# Patient Record
Sex: FEMALE | Race: WHITE | NOT HISPANIC OR LATINO | Employment: OTHER | URBAN - NONMETROPOLITAN AREA
[De-identification: names, ages, dates, MRNs, and addresses within clinical notes are randomized per-mention and may not be internally consistent; named-entity substitution may affect disease eponyms.]

---

## 2020-03-11 ENCOUNTER — APPOINTMENT (EMERGENCY)
Dept: RADIOLOGY | Facility: HOSPITAL | Age: 85
DRG: 312 | End: 2020-03-11
Payer: MEDICARE

## 2020-03-11 ENCOUNTER — HOSPITAL ENCOUNTER (INPATIENT)
Facility: HOSPITAL | Age: 85
LOS: 1 days | Discharge: HOME HEALTH | DRG: 312 | End: 2020-03-13
Attending: EMERGENCY MEDICINE | Admitting: INTERNAL MEDICINE
Payer: MEDICARE

## 2020-03-11 ENCOUNTER — APPOINTMENT (EMERGENCY)
Dept: CT IMAGING | Facility: HOSPITAL | Age: 85
DRG: 312 | End: 2020-03-11
Payer: MEDICARE

## 2020-03-11 DIAGNOSIS — R53.1 GENERALIZED WEAKNESS: ICD-10-CM

## 2020-03-11 DIAGNOSIS — H53.9 VISUAL DISTURBANCE: ICD-10-CM

## 2020-03-11 DIAGNOSIS — R55 NEAR SYNCOPE: Primary | ICD-10-CM

## 2020-03-11 DIAGNOSIS — H53.10 SUBJECTIVE VISUAL DISTURBANCE OF RIGHT EYE: ICD-10-CM

## 2020-03-11 DIAGNOSIS — G45.9 TIA (TRANSIENT ISCHEMIC ATTACK): ICD-10-CM

## 2020-03-11 LAB
ALBUMIN SERPL BCP-MCNC: 3.7 G/DL (ref 3.5–5)
ALP SERPL-CCNC: 81 U/L (ref 46–116)
ALT SERPL W P-5'-P-CCNC: 17 U/L (ref 12–78)
ANION GAP SERPL CALCULATED.3IONS-SCNC: 6 MMOL/L (ref 4–13)
APTT PPP: 35 SECONDS (ref 23–37)
AST SERPL W P-5'-P-CCNC: 16 U/L (ref 5–45)
BACTERIA UR QL AUTO: NORMAL /HPF
BASOPHILS # BLD AUTO: 0.01 THOUSANDS/ΜL (ref 0–0.1)
BASOPHILS NFR BLD AUTO: 0 % (ref 0–1)
BILIRUB SERPL-MCNC: 0.61 MG/DL (ref 0.2–1)
BILIRUB UR QL STRIP: NEGATIVE
BUN SERPL-MCNC: 18 MG/DL (ref 5–25)
CALCIUM SERPL-MCNC: 8.9 MG/DL (ref 8.3–10.1)
CHLORIDE SERPL-SCNC: 106 MMOL/L (ref 100–108)
CLARITY UR: CLEAR
CO2 SERPL-SCNC: 28 MMOL/L (ref 21–32)
COLOR UR: ABNORMAL
CREAT SERPL-MCNC: 1.25 MG/DL (ref 0.6–1.3)
EOSINOPHIL # BLD AUTO: 0.03 THOUSAND/ΜL (ref 0–0.61)
EOSINOPHIL NFR BLD AUTO: 1 % (ref 0–6)
ERYTHROCYTE [DISTWIDTH] IN BLOOD BY AUTOMATED COUNT: 13.2 % (ref 11.6–15.1)
ERYTHROCYTE [SEDIMENTATION RATE] IN BLOOD: 27 MM/HOUR (ref 0–20)
GFR SERPL CREATININE-BSD FRML MDRD: 36 ML/MIN/1.73SQ M
GLUCOSE SERPL-MCNC: 99 MG/DL (ref 65–140)
GLUCOSE UR STRIP-MCNC: NEGATIVE MG/DL
HCT VFR BLD AUTO: 44.8 % (ref 34.8–46.1)
HGB BLD-MCNC: 14.2 G/DL (ref 11.5–15.4)
HGB UR QL STRIP.AUTO: NEGATIVE
IMM GRANULOCYTES # BLD AUTO: 0.05 THOUSAND/UL (ref 0–0.2)
IMM GRANULOCYTES NFR BLD AUTO: 1 % (ref 0–2)
INR PPP: 2.13 (ref 0.84–1.19)
KETONES UR STRIP-MCNC: NEGATIVE MG/DL
LEUKOCYTE ESTERASE UR QL STRIP: NEGATIVE
LYMPHOCYTES # BLD AUTO: 1.22 THOUSANDS/ΜL (ref 0.6–4.47)
LYMPHOCYTES NFR BLD AUTO: 21 % (ref 14–44)
MCH RBC QN AUTO: 29.9 PG (ref 26.8–34.3)
MCHC RBC AUTO-ENTMCNC: 31.7 G/DL (ref 31.4–37.4)
MCV RBC AUTO: 94 FL (ref 82–98)
MONOCYTES # BLD AUTO: 0.84 THOUSAND/ΜL (ref 0.17–1.22)
MONOCYTES NFR BLD AUTO: 15 % (ref 4–12)
NEUTROPHILS # BLD AUTO: 3.59 THOUSANDS/ΜL (ref 1.85–7.62)
NEUTS SEG NFR BLD AUTO: 62 % (ref 43–75)
NITRITE UR QL STRIP: NEGATIVE
NON-SQ EPI CELLS URNS QL MICRO: NORMAL /HPF
NRBC BLD AUTO-RTO: 0 /100 WBCS
NT-PROBNP SERPL-MCNC: 243 PG/ML
PH UR STRIP.AUTO: 7 [PH]
PLATELET # BLD AUTO: 246 THOUSANDS/UL (ref 149–390)
PLATELET # BLD AUTO: 263 THOUSANDS/UL (ref 149–390)
PMV BLD AUTO: 9.8 FL (ref 8.9–12.7)
PMV BLD AUTO: 9.9 FL (ref 8.9–12.7)
POTASSIUM SERPL-SCNC: 5.1 MMOL/L (ref 3.5–5.3)
PROT SERPL-MCNC: 8.5 G/DL (ref 6.4–8.2)
PROT UR STRIP-MCNC: ABNORMAL MG/DL
PROTHROMBIN TIME: 24.1 SECONDS (ref 11.6–14.5)
RBC # BLD AUTO: 4.75 MILLION/UL (ref 3.81–5.12)
RBC #/AREA URNS AUTO: NORMAL /HPF
SODIUM SERPL-SCNC: 140 MMOL/L (ref 136–145)
SP GR UR STRIP.AUTO: 1.01 (ref 1–1.03)
TROPONIN I SERPL-MCNC: <0.02 NG/ML
UROBILINOGEN UR QL STRIP.AUTO: 0.2 E.U./DL
WBC # BLD AUTO: 5.74 THOUSAND/UL (ref 4.31–10.16)
WBC #/AREA URNS AUTO: NORMAL /HPF

## 2020-03-11 PROCEDURE — 96360 HYDRATION IV INFUSION INIT: CPT

## 2020-03-11 PROCEDURE — 85652 RBC SED RATE AUTOMATED: CPT | Performed by: INTERNAL MEDICINE

## 2020-03-11 PROCEDURE — 36415 COLL VENOUS BLD VENIPUNCTURE: CPT | Performed by: EMERGENCY MEDICINE

## 2020-03-11 PROCEDURE — 70450 CT HEAD/BRAIN W/O DYE: CPT

## 2020-03-11 PROCEDURE — 93005 ELECTROCARDIOGRAM TRACING: CPT

## 2020-03-11 PROCEDURE — 99285 EMERGENCY DEPT VISIT HI MDM: CPT | Performed by: EMERGENCY MEDICINE

## 2020-03-11 PROCEDURE — 85730 THROMBOPLASTIN TIME PARTIAL: CPT | Performed by: EMERGENCY MEDICINE

## 2020-03-11 PROCEDURE — 71045 X-RAY EXAM CHEST 1 VIEW: CPT

## 2020-03-11 PROCEDURE — 85610 PROTHROMBIN TIME: CPT | Performed by: EMERGENCY MEDICINE

## 2020-03-11 PROCEDURE — 83880 ASSAY OF NATRIURETIC PEPTIDE: CPT | Performed by: EMERGENCY MEDICINE

## 2020-03-11 PROCEDURE — 85025 COMPLETE CBC W/AUTO DIFF WBC: CPT | Performed by: EMERGENCY MEDICINE

## 2020-03-11 PROCEDURE — 81001 URINALYSIS AUTO W/SCOPE: CPT | Performed by: EMERGENCY MEDICINE

## 2020-03-11 PROCEDURE — 85049 AUTOMATED PLATELET COUNT: CPT | Performed by: INTERNAL MEDICINE

## 2020-03-11 PROCEDURE — 99285 EMERGENCY DEPT VISIT HI MDM: CPT

## 2020-03-11 PROCEDURE — 84484 ASSAY OF TROPONIN QUANT: CPT | Performed by: EMERGENCY MEDICINE

## 2020-03-11 PROCEDURE — 80053 COMPREHEN METABOLIC PANEL: CPT | Performed by: EMERGENCY MEDICINE

## 2020-03-11 PROCEDURE — 99220 PR INITIAL OBSERVATION CARE/DAY 70 MINUTES: CPT | Performed by: INTERNAL MEDICINE

## 2020-03-11 RX ORDER — WARFARIN SODIUM 2.5 MG/1
2.5 TABLET ORAL
COMMUNITY

## 2020-03-11 RX ORDER — MELATONIN
2000 2 TIMES DAILY
COMMUNITY
End: 2020-03-13 | Stop reason: HOSPADM

## 2020-03-11 RX ORDER — ASPIRIN 81 MG/1
81 TABLET, CHEWABLE ORAL DAILY
Status: DISCONTINUED | OUTPATIENT
Start: 2020-03-12 | End: 2020-03-13 | Stop reason: HOSPADM

## 2020-03-11 RX ORDER — WARFARIN SODIUM 2.5 MG/1
2.5 TABLET ORAL
Status: DISCONTINUED | OUTPATIENT
Start: 2020-03-11 | End: 2020-03-13 | Stop reason: HOSPADM

## 2020-03-11 RX ORDER — NIFEDIPINE 30 MG/1
30 TABLET, EXTENDED RELEASE ORAL DAILY
Status: DISCONTINUED | OUTPATIENT
Start: 2020-03-12 | End: 2020-03-13 | Stop reason: HOSPADM

## 2020-03-11 RX ORDER — NIFEDIPINE 30 MG/1
30 TABLET, FILM COATED, EXTENDED RELEASE ORAL
COMMUNITY

## 2020-03-11 RX ADMIN — WARFARIN SODIUM 2.5 MG: 2.5 TABLET ORAL at 20:59

## 2020-03-11 RX ADMIN — SODIUM CHLORIDE 1000 ML: 0.9 INJECTION, SOLUTION INTRAVENOUS at 15:52

## 2020-03-11 NOTE — H&P
H&P- Nuria Hein 12/26/1920, 80 y o  female MRN: [id number]    Unit/Bed#: ED 04 Encounter: [id number]    Primary Care Provider: Radha Murray DO   Date and time admitted to hospital: 3/11/2020  3:04 PM        * Visual disturbance  Assessment & Plan  Follow-up on MRI  Stroke pathway  Supportive care  Weakness  Assessment & Plan  As above  Near syncope  Assessment & Plan  Orthostatic blood pressure  Gentle hydration  Neurochecks  Observation  Physical therapy/occupational therapy          VTE Prophylaxis: Enoxaparin (Lovenox)  / sequential compression device   Code Status:  Level 3 DNR  POLST: There is no POLST form on file for this patient (pre-hospital)  Discussion with family:  Family outside room  Anticipated Length of Stay:  Patient will be admitted on an Observation basis with an anticipated length of stay of  less 2 midnights  Justification for Hospital Stay:  Needs further evaluation of symptoms    Total Time for Visit, including Counseling / Coordination of Care: 45 minutes  Greater than 50% of this total time spent on direct patient counseling and coordination of care  Chief Complaint:   Near syncope    History of Present Illness:    Leilani Hampton is a 80 y o  female who presents with symptoms of weakness with presentation near-syncope  She has a known history of stroke on Coumadin therapy and hypertension who presents the hospital emergency room with complaints of lightheadedness  The patient reported be pale and shaky by the family  Her home health aide called the family who presented with her to the ER  Family particularly concerned about prior history of right visual deficits however this is chronic issue  She was brought into the hospital for possible near-syncope symptoms  She denies any loss of consciousness  Review of Systems:    Review of Systems   Constitutional: Negative for activity change, appetite change and diaphoresis     HENT: Negative for congestion  Respiratory: Negative for cough, choking, chest tightness and shortness of breath  Cardiovascular: Negative for chest pain and leg swelling  Neurological: Positive for dizziness and light-headedness  Negative for tremors, syncope, facial asymmetry, speech difficulty, weakness and headaches  All other systems reviewed and are negative  Past Medical and Surgical History:     Past Medical History:   Diagnosis Date    Hypertension     Stroke [facility])        History reviewed  No pertinent surgical history  Meds/Allergies:    Prior to Admission medications    Medication Sig Start Date End Date Taking? Authorizing Provider   NIFEdipine ER (ADALAT CC) 30 MG 24 hr tablet Take 30 mg by mouth daily   Yes Historical Provider, MD   warfarin (COUMADIN) 2 5 mg tablet Take 2 5 mg by mouth daily   Yes Historical Provider, MD     I have reviewed home medications with patient personally  Allergies: Allergies   Allergen Reactions    Ace Inhibitors     Clonidine     Fexofenadine     Gabapentin     Prednisolone     Simvastatin        Social History:     Marital Status: /Civil Union   Occupation:  Retired  Patient Pre-hospital Living Situation:  Home  Patient Pre-hospital Level of Mobility:  Ambulatory  Patient Pre-hospital Diet Restrictions:  Denies  Substance Use History:   Social History     Substance and Sexual Activity   Alcohol Use Never    Frequency: Never     Social History     Tobacco Use   Smoking Status Never Smoker   Smokeless Tobacco Never Used     Social History     Substance and Sexual Activity   Drug Use Never       Family History:    History reviewed  No pertinent family history      Physical Exam:     Vitals:   Blood Pressure: (!) 190/91 (03/11/20 1637)  Pulse: 69 (03/11/20 1637)  Temperature: 97 8 °F (36 6 °C) (03/11/20 1510)  Temp Source: Temporal (03/11/20 1510)  Respirations: 20 (03/11/20 1637)  Height: 5' 3" (160 cm) (03/11/20 1510)  Weight - Scale: 76 kg (167 lb 8 8 oz) (03/11/20 1510)  SpO2: 94 % (03/11/20 1637)    Physical Exam   Constitutional: She is oriented to person, place, and time  She appears well-developed and well-nourished  HENT:   Head: Normocephalic and atraumatic  Pulmonary/Chest: Effort normal and breath sounds normal    Neurological: She is alert and oriented to person, place, and time  She displays normal reflexes  No cranial nerve deficit or sensory deficit  She exhibits normal muscle tone  Coordination normal            Additional Data:     Lab Results: I have personally reviewed pertinent reports  Results from last 7 days   Lab Units 03/11/20  1550   WBC Thousand/uL 5 74   HEMOGLOBIN g/dL 14 2   HEMATOCRIT % 44 8   PLATELETS Thousands/uL 263   NEUTROS PCT % 62   LYMPHS PCT % 21   MONOS PCT % 15*   EOS PCT % 1     Results from last 7 days   Lab Units 03/11/20  1550   SODIUM mmol/L 140   POTASSIUM mmol/L 5 1   CHLORIDE mmol/L 106   CO2 mmol/L 28   BUN mg/dL 18   CREATININE mg/dL 1 25   ANION GAP mmol/L 6   CALCIUM mg/dL 8 9   ALBUMIN g/dL 3 7   TOTAL BILIRUBIN mg/dL 0 61   ALK PHOS U/L 81   ALT U/L 17   AST U/L 16   GLUCOSE RANDOM mg/dL 99     Results from last 7 days   Lab Units 03/11/20  1550   INR  2 13*                   Imaging: I have personally reviewed pertinent reports  XR chest 1 view portable   Final Result by Judy Tyson DO (03/11 1612)      No acute cardiopulmonary disease  Workstation performed: [id number]         CT head without contrast   Final Result by Tracy Mckee MD (03/11 1600)         1  Evidence for remote microangiopathic and ischemic disease without acute intracranial abnormality noted  Workstation performed: [id number]             ** Please Note: This note has been constructed using a voice recognition system   **

## 2020-03-11 NOTE — ASSESSMENT & PLAN NOTE
Orthostatic blood pressure  Gentle hydration  Neurochecks  Observation  Physical therapy/occupational therapy

## 2020-03-11 NOTE — ED PROVIDER NOTES
History  Chief Complaint   Patient presents with    Weakness - Generalized     pt  lives with oldest son and has home health aid came for in home visit today and pt  stated she felt "weak and shakey", c/o black spot over right eye, pt  c/o pain to right leg, hx of cva, pt  on coumadin     Patient is a 79-year-old female on Coumadin for prior stroke also with history of hypertension presents the emergency department for complaint of an episode this morning where she was lightheaded pale and shaky by report of home health aide who called family patient also reports a visual disturbance in her right eye patient family report that subsequent to her most recent stroke she had a right eye visual deficit however they believe that it may have become worse this morning the patient does however report that her vision in her right eye seems the same to her as it was yesterday  No change in mental status no focal numbness or weakness no slurred speech  History provided by:  Patient and relative  Neurologic Problem   Location:  Right eye  Severity:  Mild  Onset quality:  Unable to specify  Timing:  Intermittent  Progression:  Waxing and waning  Chronicity:  Chronic  Associated symptoms: fatigue    Associated symptoms: no abdominal pain, no chest pain, no congestion, no cough, no diarrhea, no ear pain, no fever, no headaches, no myalgias, no nausea, no rash, no rhinorrhea, no shortness of breath, no sore throat, no vomiting and no wheezing        Prior to Admission Medications   Prescriptions Last Dose Informant Patient Reported? Taking? NIFEdipine ER (ADALAT CC) 30 MG 24 hr tablet   Yes Yes   Sig: Take 30 mg by mouth daily   warfarin (COUMADIN) 2 5 mg tablet   Yes Yes   Sig: Take 2 5 mg by mouth daily      Facility-Administered Medications: None       Past Medical History:   Diagnosis Date    Hypertension     Stroke [facility])        History reviewed  No pertinent surgical history  History reviewed   No pertinent family history  I have reviewed and agree with the history as documented  E-Cigarette/Vaping    E-Cigarette Use Never User      E-Cigarette/Vaping Substances    Nicotine No     THC No     CBD No     Flavoring No     Other No      Social History     Tobacco Use    Smoking status: Never Smoker    Smokeless tobacco: Never Used   Substance Use Topics    Alcohol use: Never     Frequency: Never    Drug use: Never       Review of Systems   Constitutional: Positive for fatigue  Negative for activity change, appetite change, chills and fever  HENT: Negative for congestion, ear pain, rhinorrhea and sore throat  Eyes: Positive for visual disturbance  Negative for discharge and redness  Respiratory: Negative for cough, chest tightness, shortness of breath and wheezing  Cardiovascular: Negative for chest pain and palpitations  Gastrointestinal: Negative for abdominal pain, constipation, diarrhea, nausea and vomiting  Endocrine: Negative for polydipsia and polyuria  Genitourinary: Negative for difficulty urinating, dysuria, frequency, hematuria and urgency  Musculoskeletal: Negative for arthralgias and myalgias  Skin: Negative for color change, pallor and rash  Neurological: Positive for syncope, weakness and light-headedness  Negative for dizziness, numbness and headaches  Hematological: Negative for adenopathy  Does not bruise/bleed easily  All other systems reviewed and are negative  Physical Exam  Physical Exam   Constitutional: She is oriented to person, place, and time  She appears well-developed and well-nourished  HENT:   Head: Normocephalic and atraumatic  Right Ear: External ear normal    Left Ear: External ear normal    Nose: Nose normal    Mouth/Throat: Oropharynx is clear and moist    Eyes: Pupils are equal, round, and reactive to light  Conjunctivae and EOM are normal    Neck: Normal range of motion  Neck supple     Cardiovascular: Normal rate, regular rhythm, normal heart sounds and intact distal pulses  Pulmonary/Chest: Effort normal and breath sounds normal  No respiratory distress  She has no wheezes  She has no rales  She exhibits no tenderness  Abdominal: Soft  Bowel sounds are normal  She exhibits no distension  There is no tenderness  There is no guarding  Musculoskeletal: Normal range of motion  Neurological: She is alert and oriented to person, place, and time  No cranial nerve deficit or sensory deficit  Skin: Skin is warm and dry  Psychiatric: She has a normal mood and affect  Nursing note and vitals reviewed        Vital Signs  ED Triage Vitals   Temperature Pulse Respirations Blood Pressure SpO2   03/11/20 1505 03/11/20 1510 03/11/20 1510 03/11/20 1510 03/11/20 1510   97 8 °F (36 6 °C) 72 18 134/74 97 %      Temp Source Heart Rate Source Patient Position - Orthostatic VS BP Location FiO2 (%)   03/11/20 1505 03/11/20 1510 03/11/20 1510 03/11/20 1510 --   Temporal Monitor Sitting Left arm       Pain Score       03/11/20 1637       No Pain           Vitals:    03/11/20 1510 03/11/20 1637   BP: 134/74 (!) 190/91   Pulse: 72 69   Patient Position - Orthostatic VS: Sitting          Visual Acuity  Visual Acuity      Most Recent Value   L Pupil Size (mm)  3   R Pupil Size (mm)  3   L Pupil Shape  Round   R Pupil Shape  Round          ED Medications  Medications   sodium chloride 0 9 % bolus 1,000 mL (1,000 mL Intravenous New Bag 3/11/20 1552)       Diagnostic Studies  Results Reviewed     Procedure Component Value Units Date/Time    Urine Microscopic [[id number]]  (Normal) Collected:  03/11/20 1809    Lab Status:  Final result Specimen:  Urine, Clean Catch Updated:  03/11/20 1831     RBC, UA 0-5 /hpf      WBC, UA 0-5 /hpf      Epithelial Cells Occasional /hpf      Bacteria, UA Occasional /hpf     UA w Reflex to Microscopic w Reflex to Culture [[id number]]  (Abnormal) Collected:  03/11/20 1809    Lab Status:  Final result Specimen:  Urine, Clean Catch Updated: 03/11/20 1816     Color, UA Straw     Clarity, UA Clear     Specific Gravity, UA 1 010     pH, UA 7 0     Leukocytes, UA Negative     Nitrite, UA Negative     Protein, UA Trace mg/dl      Glucose, UA Negative mg/dl      Ketones, UA Negative mg/dl      Urobilinogen, UA 0 2 E U /dl      Bilirubin, UA Negative     Blood, UA Negative    Comprehensive metabolic panel [[id number]]  (Abnormal) Collected:  03/11/20 1550    Lab Status:  Final result Specimen:  Blood from Arm, Right Updated:  03/11/20 1619     Sodium 140 mmol/L      Potassium 5 1 mmol/L      Chloride 106 mmol/L      CO2 28 mmol/L      ANION GAP 6 mmol/L      BUN 18 mg/dL      Creatinine 1 25 mg/dL      Glucose 99 mg/dL      Calcium 8 9 mg/dL      AST 16 U/L      ALT 17 U/L      Alkaline Phosphatase 81 U/L      Total Protein 8 5 g/dL      Albumin 3 7 g/dL      Total Bilirubin 0 61 mg/dL      eGFR 36 ml/min/1 73sq m     Narrative:       [facility] guidelines for Chronic Kidney Disease (CKD):     Stage 1 with normal or high GFR (GFR > 90 mL/min/1 73 square meters)    Stage 2 Mild CKD (GFR = 60-89 mL/min/1 73 square meters)    Stage 3A Moderate CKD (GFR = 45-59 mL/min/1 73 square meters)    Stage 3B Moderate CKD (GFR = 30-44 mL/min/1 73 square meters)    Stage 4 Severe CKD (GFR = 15-29 mL/min/1 73 square meters)    Stage 5 End Stage CKD (GFR <15 mL/min/1 73 square meters)  Note: GFR calculation is accurate only with a steady state creatinine    NT-BNP PRO [[id number]]  (Normal) Collected:  03/11/20 1550    Lab Status:  Final result Specimen:  Blood from Arm, Right Updated:  03/11/20 1619     NT-proBNP 243 pg/mL     Troponin I [[id number]]  (Normal) Collected:  03/11/20 1550    Lab Status:  Final result Specimen:  Blood from Arm, Right Updated:  03/11/20 1616     Troponin I <0 02 ng/mL     Protime-INR [[id number]]  (Abnormal) Collected:  03/11/20 1550    Lab Status:  Final result Specimen:  Blood from Arm, Right Updated:  03/11/20 1612 Protime 24 1 seconds      INR 2 13    APTT [[id number]]  (Normal) Collected:  03/11/20 1550    Lab Status:  Final result Specimen:  Blood from Arm, Right Updated:  03/11/20 1612     PTT 35 seconds     CBC and differential [[id number]]  (Abnormal) Collected:  03/11/20 1550    Lab Status:  Final result Specimen:  Blood from Arm, Right Updated:  03/11/20 1558     WBC 5 74 Thousand/uL      RBC 4 75 Million/uL      Hemoglobin 14 2 g/dL      Hematocrit 44 8 %      MCV 94 fL      MCH 29 9 pg      MCHC 31 7 g/dL      RDW 13 2 %      MPV 9 9 fL      Platelets 680 Thousands/uL      nRBC 0 /100 WBCs      Neutrophils Relative 62 %      Immat GRANS % 1 %      Lymphocytes Relative 21 %      Monocytes Relative 15 %      Eosinophils Relative 1 %      Basophils Relative 0 %      Neutrophils Absolute 3 59 Thousands/µL      Immature Grans Absolute 0 05 Thousand/uL      Lymphocytes Absolute 1 22 Thousands/µL      Monocytes Absolute 0 84 Thousand/µL      Eosinophils Absolute 0 03 Thousand/µL      Basophils Absolute 0 01 Thousands/µL                  XR chest 1 view portable   Final Result by Tristan Shin DO (03/11 1612)      No acute cardiopulmonary disease  Workstation performed: [id number]         CT head without contrast   Final Result by Uri Milan MD (03/11 1600)         1  Evidence for remote microangiopathic and ischemic disease without acute intracranial abnormality noted  Workstation performed: [id number]                    Procedures  ECG 12 Lead Documentation Only  Date/Time: 3/11/2020 3:19 PM  Performed by: Belen Wilkins DO  Authorized by:  Belen Wilkins DO     ECG reviewed by me, the ED Provider: yes    Patient location:  ED  Previous ECG:     Comparison to cardiac monitor: Yes    Quality:     Tracing quality:  Limited by artifact  Rate:     ECG rate:  73    ECG rate assessment: normal    Rhythm:     Rhythm: junctional    QRS:     QRS axis:  Left  ST segments:     ST segments: Non-specific  T waves:     T waves: non-specific               ED Course  ED Course as of Mar 11 1844   Wed Mar 11, 2020   1647 Patient remains clinically hemodynamically and neurologically stable in the emergency department NIH stroke score is 0 on my neurologic evaluation she reports that her right eye symptoms are unchanged from chronic daughter is concerned that they were changed earlier also concerned about an episode of what sounds like near-syncope with shakiness and paleness that was witnessed by home care nurse today  Will refer for med surge observation for further evaluation and testing possible inpatient brain MRI for ruling out subacute infarct or recurrent stroke  Patient would not be a candidate for tPA given no inclusions present and she is on Coumadin and onset of symptoms are unclear and not highly consistent with an acute stroke  Brayden Lopez 60 with Dr Marixa Davenport reviewed case and findings and management in the ED he accepts for observation                                        MDM  Number of Diagnoses or Management Options  Generalized weakness: new and requires workup  Near syncope: new and requires workup  Subjective visual disturbance of right eye: new and requires workup  TIA (transient ischemic attack): new and requires workup     Amount and/or Complexity of Data Reviewed  Clinical lab tests: ordered and reviewed  Tests in the radiology section of CPT®: ordered and reviewed  Tests in the medicine section of CPT®: ordered and reviewed  Decide to obtain previous medical records or to obtain history from someone other than the patient: yes  Review and summarize past medical records: yes  Independent visualization of images, tracings, or specimens: yes    Risk of Complications, Morbidity, and/or Mortality  Presenting problems: moderate  Diagnostic procedures: moderate  Management options: moderate    Patient Progress  Patient progress: stable        Disposition  Final diagnoses:   Near syncope Generalized weakness   Subjective visual disturbance of right eye - Chronic per patient   TIA (transient ischemic attack) - Rule out     Time reflects when diagnosis was documented in both MDM as applicable and the Disposition within this note     Time User Action Codes Description Comment    3/11/2020  4:15 PM Marijane Sparrow Add [R55] Near syncope     3/11/2020  4:15 PM Benson Barker Add [R53 1] Generalized weakness     3/11/2020  4:16 PM Marijane Sparrow Add [H53 10] Subjective visual disturbance of right eye     3/11/2020  4:16 PM Marijane Sparrow Modify [S47 13] Subjective visual disturbance of right eye Chronic per patient    3/11/2020  4:16 PM Benson Barker Add [G45 9] TIA (transient ischemic attack)     3/11/2020  4:16 PM Marijane Sparrow Modify [G45 9] TIA (transient ischemic attack) Rule out    3/11/2020  6:27 PM Karla Hock S Add [H53 9] Visual disturbance       ED Disposition     ED Disposition Condition Date/Time Comment    Admit Stable Wed Mar 11, 2020  4:15 PM Case was discussed with Dr Isrrael Saleh and the patient's admission status was agreed to be Admission Status: inpatient status to the service of Dr Isrrael Saleh  Follow-up Information    None         Patient's Medications   Discharge Prescriptions    No medications on file     No discharge procedures on file      PDMP Review     None          ED Provider  Electronically Signed by              Vernon Gomes DO  03/11/20 5829

## 2020-03-12 ENCOUNTER — APPOINTMENT (OUTPATIENT)
Dept: MRI IMAGING | Facility: HOSPITAL | Age: 85
DRG: 312 | End: 2020-03-12
Payer: MEDICARE

## 2020-03-12 LAB
ALBUMIN SERPL BCP-MCNC: 3.2 G/DL (ref 3.5–5)
ALP SERPL-CCNC: 73 U/L (ref 46–116)
ALT SERPL W P-5'-P-CCNC: 18 U/L (ref 12–78)
ANION GAP SERPL CALCULATED.3IONS-SCNC: 11 MMOL/L (ref 4–13)
AST SERPL W P-5'-P-CCNC: 16 U/L (ref 5–45)
BILIRUB SERPL-MCNC: 0.6 MG/DL (ref 0.2–1)
BUN SERPL-MCNC: 18 MG/DL (ref 5–25)
CALCIUM SERPL-MCNC: 7.9 MG/DL (ref 8.3–10.1)
CHLORIDE SERPL-SCNC: 107 MMOL/L (ref 100–108)
CHOLEST SERPL-MCNC: 182 MG/DL (ref 50–200)
CO2 SERPL-SCNC: 25 MMOL/L (ref 21–32)
CREAT SERPL-MCNC: 1.15 MG/DL (ref 0.6–1.3)
EST. AVERAGE GLUCOSE BLD GHB EST-MCNC: 114 MG/DL
GFR SERPL CREATININE-BSD FRML MDRD: 39 ML/MIN/1.73SQ M
GLUCOSE P FAST SERPL-MCNC: 87 MG/DL (ref 65–99)
GLUCOSE SERPL-MCNC: 87 MG/DL (ref 65–140)
HBA1C MFR BLD: 5.6 %
HDLC SERPL-MCNC: 45 MG/DL
INR PPP: 2.14 (ref 0.84–1.19)
LDLC SERPL CALC-MCNC: 122 MG/DL (ref 0–100)
MAGNESIUM SERPL-MCNC: 1.9 MG/DL (ref 1.6–2.6)
PHOSPHATE SERPL-MCNC: 3.3 MG/DL (ref 2.3–4.1)
POTASSIUM SERPL-SCNC: 3.9 MMOL/L (ref 3.5–5.3)
PROT SERPL-MCNC: 7.5 G/DL (ref 6.4–8.2)
PROTHROMBIN TIME: 24.1 SECONDS (ref 11.6–14.5)
SODIUM SERPL-SCNC: 143 MMOL/L (ref 136–145)
TRIGL SERPL-MCNC: 73 MG/DL

## 2020-03-12 PROCEDURE — 99225 PR SBSQ OBSERVATION CARE/DAY 25 MINUTES: CPT | Performed by: INTERNAL MEDICINE

## 2020-03-12 PROCEDURE — 85610 PROTHROMBIN TIME: CPT | Performed by: INTERNAL MEDICINE

## 2020-03-12 PROCEDURE — 83735 ASSAY OF MAGNESIUM: CPT | Performed by: INTERNAL MEDICINE

## 2020-03-12 PROCEDURE — 92610 EVALUATE SWALLOWING FUNCTION: CPT

## 2020-03-12 PROCEDURE — 80053 COMPREHEN METABOLIC PANEL: CPT | Performed by: INTERNAL MEDICINE

## 2020-03-12 PROCEDURE — 97167 OT EVAL HIGH COMPLEX 60 MIN: CPT

## 2020-03-12 PROCEDURE — 84100 ASSAY OF PHOSPHORUS: CPT | Performed by: INTERNAL MEDICINE

## 2020-03-12 PROCEDURE — 83036 HEMOGLOBIN GLYCOSYLATED A1C: CPT | Performed by: INTERNAL MEDICINE

## 2020-03-12 PROCEDURE — 80061 LIPID PANEL: CPT | Performed by: INTERNAL MEDICINE

## 2020-03-12 PROCEDURE — 70551 MRI BRAIN STEM W/O DYE: CPT

## 2020-03-12 PROCEDURE — 97163 PT EVAL HIGH COMPLEX 45 MIN: CPT

## 2020-03-12 RX ORDER — DIAPER,BRIEF,INFANT-TODD,DISP
EACH MISCELLANEOUS 4 TIMES DAILY PRN
Status: DISCONTINUED | OUTPATIENT
Start: 2020-03-12 | End: 2020-03-13 | Stop reason: HOSPADM

## 2020-03-12 RX ADMIN — WARFARIN SODIUM 2.5 MG: 2.5 TABLET ORAL at 18:09

## 2020-03-12 RX ADMIN — NIFEDIPINE 30 MG: 30 TABLET, FILM COATED, EXTENDED RELEASE ORAL at 08:07

## 2020-03-12 RX ADMIN — ASPIRIN 81 MG 81 MG: 81 TABLET ORAL at 08:07

## 2020-03-12 RX ADMIN — ENOXAPARIN SODIUM 40 MG: 40 INJECTION SUBCUTANEOUS at 08:07

## 2020-03-12 NOTE — SOCIAL WORK
CM Consult for Ischemic Stroke  Pt is in OBS status  CM met with patient and son Eun Mayer at the bedside,baseline information was obtained  CM discussed the role of CM in helping the patient develop a discharge plan and assist the patient in carry out their plan  Pt lives with her other son Issac Lacey in a 1 SH, 1 ALFONSO  Pt requires minimal assistance for ADLs  Pt has a caregiver through [employer] that comes to the home M-W-F for 2-2 1/2 hours to assist with ADLs and housework  Pt ambulates with a Rollator walker or cane (within home)  Pt is retired  Pt does not drive she relies on her family for transportation  Pt has past hx of STR at [facility] and [address] at d/c from [address] [facility]  Pt denies hx of MH or D&A tx  PCP: Dr Juan Daniel Mckee at [facility]   Preferred Pharmacy: [facility]  Contact: Oriana Candelario (son) [phone] --lives in [place], [place] or [facility] (daughter) [phone]  Pt states her son Eun Mayer and daughter Laurie Garza share [place]  CM made f/u PCP for pt   Information added to AVS

## 2020-03-12 NOTE — PHYSICAL THERAPY NOTE
PHYSICAL THERAPY EVALUATION  NAME:  Tanya   DATE: 03/12/20    AGE:   80 y o   Mrn:   [id number]  ADMIT DX:  TIA (transient ischemic attack) [G45 9]  Weakness [R53 1]  Visual disturbance [H53 9]  Subjective visual disturbance of right eye [H53 10]  Near syncope [R55]  Generalized weakness [R53 1]    Past Medical History:   Diagnosis Date    Hypertension     Stroke ([facility] [address] )      Length Of Stay: 0  Performed at least 2 patient identifiers during session: Name and Birthday  PHYSICAL THERAPY EVALUATION :    03/12/20 1052   Note Type   Note type Eval only   Pain Assessment   Pain Assessment Tool 0-10   Pain Score No Pain  (at rest  c/o pain R thigh with mobility "a little")   Home Living   Type of [address] [place] [address] One level  (1 ALFONSO w L HR)   Bathroom Shower/Tub Tub/shower unit   Bathroom Toilet Raised   Bathroom Equipment Tub transfer bench;Grab bars in shower;Grab bars around toilet   Bathroom Accessibility Not accessible  (has grab bars to access bathroom as walker doesn't fit)   Home Equipment Walker;Cane;Wheelchair-manual  (cane in and out of the house, rollator in the house)   Additional Comments Aides 3x/week for bathing and cleaning  Has manual chair and transport chair for outside the home  Prior Function   Level of [place] Independent with ADLs and functional mobility   Lives With Son  (son lives in the basement)   Receives Help From Family   ADL Assistance Independent  (has assistance from aid 3x/week for bathing and cleaning)   IADLs Needs assistance   Falls in the last 6 months 0   Comments Pt reports being (I) with rollator PTA  Reports being Independent with ADLs but requires assistance with showering  Restrictions/Precautions   Other Precautions Fall Risk;Visual impairment; Bed Alarm; Chair Alarm;Cognitive;Telemetry;Multiple lines  (L homonymous hemionopsia  )   General   Additional Pertinent History Pt with h/o CVA with L visual field cut since stroke in 2006  Family/Caregiver Present Yes  (son)   Cognition   Orientation Level Oriented to person;Oriented to time  ("at the nursing home " requires cues for place/situation)   Following Commands Follows one step commands with increased time or repetition   RLE Assessment   RLE Assessment WFL  (strength 4-/5)   LLE Assessment   LLE Assessment WFL  (strength 4-/5)   Coordination   Movements are Fluid and Coordinated 0   Coordination and Movement Description decreased with alternating toe tapping on L   Light Touch   RLE Light Touch Grossly intact  (pt's son reports neuropathy)   LLE Light Touch Grossly intact  (pt's son reports neuropathy)   Bed Mobility   Supine to Sit 5  Supervision   Additional items Increased time required   Additional Comments HOB flat without bedrail   Transfers   Sit to Stand   (close supervision)   Additional items Verbal cues; Increased time required  (min cues for hand placement for safety)   Stand to Sit   (close supervision)   Additional items Increased time required;Verbal cues  (min cues for controlled descent)   Stand pivot   (close supervision)   Additional items Verbal cues; Increased time required  (min cues for turning completely prior to sitting)   Additional Comments min cues for hand placement for safety with RW   Ambulation/Elevation   Gait pattern Antalgic;Narrow KACIE; Decreased L stance; Short stride; Excessively slow; Step to  (step to lead R LE)   Gait Assistance 5  Supervision   Additional items Verbal cues  (min cues for direction)   Assistive Device Rolling walker   Distance 29' with RW with supervision with min cues for direction, wt shifing onto UEs for R LE stance to facilitate L LE step length   Stair Management Assistance   (declined stairs today )   Balance   Static Sitting Fair +   Dynamic Sitting Fair   Static Standing Fair   Dynamic Standing [address] [place] [address],Floors 3,4, & 5 -   Activity Tolerance   Activity Tolerance Patient limited by fatigue;Patient limited by pain   Medical Staff Made Aware OT, [place]   Nurse Made Aware RNCherelle   Assessment   Prognosis Good   Problem List Decreased strength;Decreased endurance; Impaired balance;Decreased mobility; Decreased cognition; Impaired judgement;Decreased safety awareness;Pain; Impaired vision   Barriers to Discharge Decreased caregiver support   Goals   Patient Goals "Go home"   STG Expiration Date 03/22/20   PT Treatment Day 0   Plan   Treatment/Interventions Functional transfer training;LE strengthening/ROM; Elevations; Therapeutic exercise; Endurance training;Cognitive reorientation;Patient/family training;Equipment eval/education; Bed mobility;Gait training; Compensatory technique education;Spoke to nursing;OT   PT Frequency Other (Comment)  (3-5x/week)   Recommendation   Recommendation Home with family support;Home PT   Equipment Recommended   (rollator-pt has)   Additional Comments [facility] 6 clicks 65/02     (Please find full objective findings from PT assessment regarding body systems outlined above)  Assessment: Pt is a 80 y o  female seen for PT evaluation s/p admission to [address] [place] [address] on 3/11/2020 with Visual disturbance  Pt with 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  Order placed for PT services    Upon evaluation: Pt is presenting with impaired functional mobility due to pain, decreased strength, decreased endurance, impaired balance, impaired coordination, gait deviations, impaired cognition, decreased safety awareness, impaired judgment, visual impairment and fall risk requiring supervision assistance for bed mobility, supervision to steadying assistance for transfers and supervision assistance for ambulation with RW  Pt's clinical presentation is currently unpredictable given the functional mobility deficits above, especially weakness, decreased endurance, gait deviations, pain, decreased activity tolerance, decreased functional mobility tolerance, decreased safety awareness, impaired judgement and decreased cognition, coupled with fall risks as indicated by AM-PAC 6-Clicks: 27/34 as well as impaired balance, impaired judgement, decreased safety awareness, altered vision and decreased cognition and combined with medical complications of hypotension, pain impacting overall mobility status and fear/retreat  Pt's PMHx and comorbidities that may affect physical performance and progress include: HTN, CVA and limited vision  Personal factors affecting pt at time of IE include: step(s) to enter environment, advanced age, inability to perform ADLs, inability to navigate level surfaces without external assistance and inability to ambulate household distances  Pt will benefit from continued skilled PT services to address deficits as defined above and to maximize level of functional mobility to facilitate return toward PLOF and improved QOL  From PT/mobility standpoint, recommendation at time of d/c would be Home PT, home with family support and with walker pending progress in order to reduce fall risk and maximize pt's functional independence and consistency with mobility in order to facilitate return to PLOF  Recommend ther ex next 1-2 sessions  Goals: Pt will: Perform bed mobility tasks with modified I to reposition in bed and prepare for transfers  Pt will perform transfers with modified I to increase Indep in home environment and decrease risk for falls and prepare for ambulation  Pt will ambulate with RW for >/= 48' with  modified I  to increase Indep in home environment, decrease burden of care, decrease risk for falls, improve activity tolerance and improve gait quality and to access home environment  Pt will complete >/= 1 step with with unilateral handrail with modified I to increase Indep in home environment, return to home with ALFONSO and decrease risk for falls          Sanjana Jhaveri, PT,DPT

## 2020-03-12 NOTE — PROGRESS NOTES
Progress Note - Nuria Hein 1920, 80 y o  female MRN: [id number]    Unit/Bed#: -Cynthia Encounter: [id number]    Primary Care Provider: Radha Murray DO   Date and time admitted to hospital: 3/11/2020  3:04 PM        * Visual disturbance  Assessment & Plan  Follow-up on MRI  Stroke pathway  Supportive care  Weakness  Assessment & Plan  As above  Near syncope  Assessment & Plan  Orthostatic blood pressure  Gentle hydration  Neurochecks  Observation  Physical therapy/occupational therapy      VTE Pharmacologic Prophylaxis:   Pharmacologic: Enoxaparin (Lovenox)  Mechanical VTE Prophylaxis in Place: No    Patient Centered Rounds: I have performed bedside rounds with nursing staff today  Time Spent for Care: 15 minutes  More than 50% of total time spent on counseling and coordination of care as described above  Current Length of Stay: 0 day(s)    Current Patient Status: Observation   Certification Statement: The patient will continue to require additional inpatient hospital stay due to Need to monitor neurological status    Discharge Plan:  Follow-up on neuro imaging and Neurology consultation    Code Status: Level 3 - DNAR and DNI      Subjective:   No acute distress    Objective:     Vitals:   Temp (24hrs), Av 5 °F (36 4 °C), Min:97 1 °F (36 2 °C), Max:97 8 °F (36 6 °C)    Temp:  [97 1 °F (36 2 °C)-97 8 °F (36 6 °C)] 97 5 °F (36 4 °C)  HR:  [67-99] 74  Resp:  [16-20] 17  BP: (134-190)/() 156/88  SpO2:  [90 %-97 %] 94 %  Body mass index is 29 68 kg/m²  Input and Output Summary (last 24 hours): Intake/Output Summary (Last 24 hours) at 3/12/2020 0953  Last data filed at 3/11/2020 2100  Gross per 24 hour   Intake 240 ml   Output 650 ml   Net -410 ml       Physical Exam:     Physical Exam   HENT:   Head: Normocephalic and atraumatic  Pulmonary/Chest: Effort normal    Abdominal: Soft  Neurological: She is alert         Additional Data:     Labs:    Results from last 7 days   Lab Units 03/11/20  2112 03/11/20  1550   WBC Thousand/uL  --  5 74   HEMOGLOBIN g/dL  --  14 2   HEMATOCRIT %  --  44 8   PLATELETS Thousands/uL 246 263   NEUTROS PCT %  --  62   LYMPHS PCT %  --  21   MONOS PCT %  --  15*   EOS PCT %  --  1     Results from last 7 days   Lab Units 03/12/20  0535   POTASSIUM mmol/L 3 9   CHLORIDE mmol/L 107   CO2 mmol/L 25   BUN mg/dL 18   CREATININE mg/dL 1 15   CALCIUM mg/dL 7 9*   ALK PHOS U/L 73   ALT U/L 18   AST U/L 16     Results from last 7 days   Lab Units 03/12/20  0535   INR  2 14*       * I Have Reviewed All Lab Data Listed Above  * Additional Pertinent Lab Tests Reviewed: All Labs Within Last 24 Hours Reviewed      Recent Cultures (last 7 days):           Last 24 Hours Medication List:     Current Facility-Administered Medications:  aspirin 81 mg Oral Daily Sole Sutton DO   enoxaparin 40 mg Subcutaneous Daily Sole Sutton DO   NIFEdipine ER 30 mg Oral Daily Sole Sutton DO   warfarin 2 5 mg Oral Daily (warfarin) Sole Sutton DO        Today, Patient Was Seen By: Nell Whitt DO    ** Please Note: Dictation voice to text software may have been used in the creation of this document   **

## 2020-03-12 NOTE — UTILIZATION REVIEW
Initial Clinical Review    Admission: Date/Time/Statement: Admission Orders (From admission, onward)     Ordered        03/11/20 1645  Place in Observation (expected length of stay for this patient is less than two midnights)  Once                   Orders Placed This Encounter   Procedures    Place in Observation (expected length of stay for this patient is less than two midnights)     Standing Status:   Standing     Number of Occurrences:   1     Order Specific Question:   Admitting Physician     Answer:   Griselda Townsend [[id number]]     Order Specific Question:   Level of Care     Answer:   Med Surg [[id number]]     ED Arrival Information     Expected Arrival Acuity Means of Arrival Escorted By Service Admission Type    3/11/2020  3/11/2020 15:02 Urgent Walk-In Family Member General Medicine Urgent    Arrival Complaint    ams        Chief Complaint   Patient presents with    Weakness - Generalized     pt  lives with oldest son and has home health aid came for in home visit today and pt  stated she felt "weak and shakey", c/o black spot over right eye, pt  c/o pain to right leg, hx of cva, pt  on coumadin     Assessment/Plan: 80 year old female to the ED from home with complaints of lightheadedness, visual disturbance on morning of her arrival   Admitted under observation for visual disturbance, near syncope  She has a known history of stroke on Coumadin therapy and hypertension who presents the hospital emergency room with complaints of lightheadedness  The patient reported be pale and shaky by the family  Her home health aide called the family who presented with her to the ER  Family particularly concerned about prior history of right visual deficits however this is chronic issue  NIHSS 0  Visual disturbance  Assessment & Plan  Follow-up on MRI    Stroke pathway  Supportive care      Weakness  Assessment & Plan  As above      Near syncope  Assessment & Plan  Orthostatic blood pressure  Gentle hydration  Neurochecks  Observation  Physical therapy/occupational therapy        ED Triage Vitals   Temperature Pulse Respirations Blood Pressure SpO2   03/11/20 1505 03/11/20 1510 03/11/20 1510 03/11/20 1510 03/11/20 1510   97 8 °F (36 6 °C) 72 18 134/74 97 %      Temp Source Heart Rate Source Patient Position - Orthostatic VS BP Location FiO2 (%)   03/11/20 1505 03/11/20 1510 03/11/20 1510 03/11/20 1510 --   Temporal Monitor Sitting Left arm       Pain Score       03/11/20 1637       No Pain        Wt Readings from Last 1 Encounters:   03/11/20 76 kg (167 lb 8 8 oz)     Additional Vital Signs:   Date/Time  Temp  Pulse  Resp  BP  MAP (mmHg)  SpO2  O2 Device  Patient Position - Orthostatic VS   03/12/20 07:11:42  97 5 °F (36 4 °C)  74  17  156/88  111  94 %  --  --   03/12/20 0655  97 5 °F (36 4 °C)  79  18  156/88  111  94 %  None (Room air)  Lying   03/12/20 0455  97 6 °F (36 4 °C)  74  17  171/95Abnormal   120  93 %  None (Room air)  Lying   03/12/20 03:07:44  97 7 °F (36 5 °C)  67  17  158/76  103  96 %  None (Room air)  Lying   03/12/20 0255  97 7 °F (36 5 °C)  70  16  158/76  103  95 %  None (Room air)  Lying   03/12/20 01:02:48  97 6 °F (36 4 °C)  80  --  153/79  104  93 %  --  --   03/12/20 0055  97 1 °F (36 2 °C)Abnormal   80  18  153/79  104  94 %  None (Room air)  Lying   03/11/20 2355  97 4 °F (36 3 °C)Abnormal   75  16  141/88  106  90 %  None (Room air)  Lying   03/11/20 2255  97 3 °F (36 3 °C)Abnormal   80  16  160/89  113  94 %  None (Room air)  Lying   03/11/20 21:56:54  97 4 °F (36 3 °C)Abnormal   99  16  161/89  113  94 %  None (Room air)  Lying   03/11/20 2055  97 4 °F (36 3 °C)Abnormal   75  17  174/93Abnormal   120  92 %  --  Sitting   03/11/20 1955  97 4 °F (36 3 °C)Abnormal   67  16  167/118Abnormal   134  96 %  None (Room air)  Sitting   03/11/20 19:44:50  97 4 °F (36 3 °C)Abnormal   74  18  167/118Abnormal   134  93 %  --  --   03/11/20 1637  --  69  20  190/91Abnormal   -- Pertinent Labs/Diagnostic Test Results:   3/11 EKG:  Quality:     Tracing quality:  Limited by artifact  Rate:     ECG rate:  73    ECG rate assessment: normal    Rhythm:     Rhythm: junctional    QRS:     QRS axis:  Left  ST segments:     ST segments:  Non-specific  T waves:     T waves: non-specific          3/11 CXR:  No acute cardiopulmonary disease  3/11 CT head:  Evidence for remote microangiopathic and ischemic disease without acute intracranial abnormality noted    Results from last 7 days   Lab Units 03/11/20 2112 03/11/20  1550   WBC Thousand/uL  --  5 74   HEMOGLOBIN g/dL  --  14 2   HEMATOCRIT %  --  44 8   PLATELETS Thousands/uL 246 263   NEUTROS ABS Thousands/µL  --  3 59         Results from last 7 days   Lab Units 03/12/20  0535 03/11/20  1550   SODIUM mmol/L 143 140   POTASSIUM mmol/L 3 9 5 1   CHLORIDE mmol/L 107 106   CO2 mmol/L 25 28   ANION GAP mmol/L 11 6   BUN mg/dL 18 18   CREATININE mg/dL 1 15 1 25   EGFR ml/min/1 73sq m 39 36   CALCIUM mg/dL 7 9* 8 9   MAGNESIUM mg/dL 1 9  --    PHOSPHORUS mg/dL 3 3  --      Results from last 7 days   Lab Units 03/12/20  0535 03/11/20  1550   AST U/L 16 16   ALT U/L 18 17   ALK PHOS U/L 73 81   TOTAL PROTEIN g/dL 7 5 8 5*   ALBUMIN g/dL 3 2* 3 7   TOTAL BILIRUBIN mg/dL 0 60 0 61         Results from last 7 days   Lab Units 03/12/20  0535 03/11/20  1550   GLUCOSE RANDOM mg/dL 87 99         Results from last 7 days   Lab Units 03/12/20  0535   HEMOGLOBIN A1C % 5 6   EAG mg/dl 114     Results from last 7 days   Lab Units 03/11/20  1550   TROPONIN I ng/mL <0 02         Results from last 7 days   Lab Units 03/12/20  0535 03/11/20  1550   PROTIME seconds 24 1* 24 1*   INR  2 14* 2 13*   PTT seconds  --  35       Results from last 7 days   Lab Units 03/11/20  1550   NT-PRO BNP pg/mL 243       Results from last 7 days   Lab Units 03/11/20  2113   SED RATE mm/hour 27*         Results from last 7 days   Lab Units 03/11/20  1809   CLARITY UA  Clear   COLOR UA Straw   SPEC GRAV UA  1 010   PH UA  7 0   GLUCOSE UA mg/dl Negative   KETONES UA mg/dl Negative   BLOOD UA  Negative   PROTEIN UA mg/dl Trace*   NITRITE UA  Negative   BILIRUBIN UA  Negative   UROBILINOGEN UA E U /dl 0 2   LEUKOCYTES UA  Negative   WBC UA /hpf 0-5   RBC UA /hpf 0-5   BACTERIA UA /hpf Occasional   EPITHELIAL CELLS WET PREP /hpf Occasional     ED Treatment:   Medication Administration from 03/11/2020 1459 to 03/11/2020 1930       Date/Time Order Dose Route Action     03/11/2020 1552 sodium chloride 0 9 % bolus 1,000 mL 1,000 mL Intravenous New Bag        Past Medical History:   Diagnosis Date    Hypertension     Stroke [facility])        Admitting Diagnosis: TIA (transient ischemic attack) [G45 9]  Weakness [R53 1]  Visual disturbance [H53 9]  Subjective visual disturbance of right eye [H53 10]  Near syncope [R55]  Generalized weakness [R53 1]  Age/Sex: 80 y o  female  Admission Orders:  Neuro checks:  Every 1 hour x 4 hours, then every 2 hours x 8 hours, then every 4 hours x 72 hours  NIHSS every 24 hours  MRI   SCDS  ECHO  Scheduled Medications:    Medications:  aspirin 81 mg Oral Daily   enoxaparin 40 mg Subcutaneous Daily   NIFEdipine ER 30 mg Oral Daily   warfarin 2 5 mg Oral Daily (warfarin)     Continuous IV Infusions:     PRN Meds:       IP CONSULT TO NEUROLOGY  IP CONSULT TO CASE MANAGEMENT  IP CONSULT TO NUTRITION SERVICES    Network Utilization Review Department  [email or web address]  ATTENTION: Please call with any questions or concerns to [phone] and carefully listen to the prompts so that you are directed to the right person  All voicemails are confidential   Judd Lawson all requests for admission clinical reviews, approved or denied determinations and any other requests to dedicated fax number below belonging to the campus where the patient is receiving treatment   List of dedicated fax numbers for the Facilities:  FACILITY NAME UR FAX NUMBER   ADMISSION DENIALS (Administrative/Medical Necessity) [address] [facility] (Maternity/NICU/Pediatrics) [facility] [phone]   [facility] [phone]   [facility] [phone]   [facility] [address] [facility] [phone]   [facility] [phone]   [address] [facility] [address]  [address] [phone]

## 2020-03-12 NOTE — SPEECH THERAPY NOTE
Speech-Language Pathology Bedside Swallow Evaluation    Patient Name: Annabelle Maharaj    HOHGA'I Date: 3/12/2020     Problem List  Principal Problem:    Visual disturbance  Active Problems:    Near syncope    Weakness      Past Medical History  Past Medical History:   Diagnosis Date    Hypertension     Stroke [facility])        Past Surgical History  History reviewed  No pertinent surgical history  Summary   Pt presented with functional appearing oral and pharyngeal stage swallowing skills with materials administered today  She reported occasional "sticking" when she swallows hard/regular solids, but manages by taking drinks during meals  No s/s aspiration or significant dysphagia with thin liquids or regular solids offered today  Risk/s for Aspiration: suspect low    Recommended Diet: regular diet and thin liquids   Recommended Form of Meds: as tolerated   Aspiration precautions and swallowing strategies: upright posture, only feed when fully alert, slow rate of feeding, small bites/sips and alternating bites and sips  Other Recommendations/Considerations: SLP offered chopped diet to pt but she said "I can cut the food if I need to "       Current Medical Status per Dr Astrid Bai H&P 3/11/2020  Annabelle Maharaj is a 80 y o  female who presents with symptoms of weakness with presentation near-syncope  She has a known history of stroke on Coumadin therapy and hypertension who presents the hospital emergency room with complaints of lightheadedness  The patient reported be pale and shaky by the family  Her home health aide called the family who presented with her to the ER  Family particularly concerned about prior history of right visual deficits however this is chronic issue  She was brought into the hospital for possible near-syncope symptoms  She denies any loss of consciousness  Current Precautions:  Fall     Special Studies:  CXR 3/11/2020 IMPRESSION:  No acute cardiopulmonary disease    CT head 3/11/2020 IMPRESSION:  1  Evidence for remote microangiopathic and ischemic disease without acute intracranial abnormality noted  Social/Education/Vocational Hx:  Pt lives with family    Swallow Information   Current Risks for Dysphagia & Aspiration: report of occasional "sticking" of food when she swallows solid foods  Current Diet: regular diet and thin liquids   Baseline Diet: regular diet and thin liquids      Baseline Assessment   Behavior/Cognition: alert  Speech/Language Status: able to participate in conversation and able to follow commands  Patient Positioning: upright in chair  Pain Status/Interventions/Response to Interventions:  No report of or nonverbal indications of pain  Swallow Mechanism Exam  Facial: symmetrical  Labial: decreased ROM left side  Lingual: WFL  Velum: symmetrical  Mandible: adequate ROM  Dentition: full dentures  Vocal quality:clear/adequate   Volitional Cough: strong/productive       Consistencies Assessed and Performance   Consistencies Administered: thin liquids, puree and hard solids  Materials administered included apple juice, apple sauce, rohan doone cookies    Oral Stage: WFL  Mastication was adequate with the materials administered today  Bolus formation and transfer were functional  Mild oral residual noted but cleared with liquid wash  No overt s/s reduced oral control  Pharyngeal Stage: WFL  Swallow Mechanics: Swallowing initiation appeared prompt  Laryngeal rise was palpated and judged to be within functional limits  No coughing, throat clearing, change in vocal quality or respiratory status noted today  Esophageal Concerns: none reported      Summary and Recommendations (see above)    Results Reviewed with: patient and family     Treatment Recommended: no additional f/u needed at this time

## 2020-03-12 NOTE — PLAN OF CARE
Problem: OCCUPATIONAL THERAPY ADULT  Goal: Performs self-care activities at highest level of function for planned discharge setting  See evaluation for individualized goals  Description  Treatment Interventions: ADL retraining, Functional transfer training, UE strengthening/ROM, Endurance training, Cognitive reorientation, Patient/family training, Equipment evaluation/education, Neuromuscular reeducation, Compensatory technique education, Continued evaluation, Energy conservation, Activityengagement          See flowsheet documentation for full assessment, interventions and recommendations  Note:   Limitation: Decreased ADL status, Decreased UE strength, Decreased Safe judgement during ADL, Decreased cognition, Decreased endurance, Decreased self-care trans, Decreased high-level ADLs  Prognosis: Good  Assessment: Pt is a 79 y/o F admitted to [address] [place] [address] under observation 3/11/2020 d/t experiencing a near-syncope episode with visual disturbance noted  Dx: visual disturbance  Pt with PMHx impacting performance during functional tasks including: HTN, past CVA  Pt reports living at home in a 1 story home with her son  Pt has 1 ALFONSO L HR into home  Pt ambulates with use of cane or rollator in the home and has a transport chair and a manual w/c for long distance community use PRN  Pt has a tub bench and grab bars in shower as well as grab bars around the toilet, raised toilet seat and grab bars on the bathroom walls d/t Pt's inability to fit rollator into restroom  Pt completes ADLs and functional mobility @ Mod I although [facility] for bathing and housekeeping tasks  Pt has assistance for all other IADL tasks  On evaluation, Pt completing supine>sit @ S with HOB flat and bed rails  Pt completing LB dressing @ CGA with extended time PRN for donning socks  Pt requiring CGA for CM while standing with UB support from RW PRN  Pt completing UB ADLs @ S after set-up   Pt completing STS and SPT transfers @ S-CGA with vc'ing for hand placement and safety  Pt's vision assessed while seated EOB  Pt able to track finger in all planes with decreased smoothness and slight loss of finger into L visual field (d/t L visual field cut d/t past CVA, 2006)  Pt able to read word in word search and reports no blurriness at this time  Pt scoring 55/100 on Barthel Index  Pt presents with decrease activity tolerance, decrease standing balance, decrease performance during ADL tasks, decrease safety awareness , decrease BUE ROM, decrease generalized strength, decrease activity engagement and decrease performance during functional transfers  Pt would benefit from continued acute OT services to address deficits as well as HHOT with continued support and assistance from family PRN upon d/c from [address] [place] [address]    Recommendation: ([address] with continued assistance from family)  OT Discharge Recommendation: ([address] with continued assistance from family)

## 2020-03-12 NOTE — SOCIAL WORK
CM followed up with pt and son to briefly discuss d/c planning  CM discussed therapy recommendations for home PT/OT  Pt and son agreeable  CM provided a list of local agencies  A post acute care recommendation was made by your care team for [facility]  Discussed Freedom of Choice with both patient and POA  List of agencies given to both patient and POA via in person  both patient and POA aware the list is custom filtered for them by zip code location and that [facility] post acute providers are designated  Pt and son requesting referrals to 1) [facility] 2) [facility] and 3) [facility]  Received call from [facility]; they are unable to accept pt d/t to staffing and case load  [facility] is able to [facility] pt with [facility] on 03/13/20  CM updated family and attending that all CM needs have been addressed

## 2020-03-12 NOTE — PLAN OF CARE
Problem: PHYSICAL THERAPY ADULT  Goal: Performs mobility at highest level of function for planned discharge setting  See evaluation for individualized goals  Description  Treatment/Interventions: Functional transfer training, LE strengthening/ROM, Elevations, Therapeutic exercise, Endurance training, Cognitive reorientation, Patient/family training, Equipment eval/education, Bed mobility, Gait training, Compensatory technique education, Spoke to nursing, OT  Equipment Recommended: (rollator-pt has)       See flowsheet documentation for full assessment, interventions and recommendations  Note:   Prognosis: Good  Problem List: Decreased strength, Decreased endurance, Impaired balance, Decreased mobility, Decreased cognition, Impaired judgement, Decreased safety awareness, Pain, Impaired vision  Assessment: Pt is a 80 y o  female seen for PT evaluation s/p admission to [address] [place] [address] on 3/11/2020 with Visual disturbance  Pt with 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  Order placed for PT services    Upon evaluation: Pt is presenting with impaired functional mobility due to pain, decreased strength, decreased endurance, impaired balance, impaired coordination, gait deviations, impaired cognition, decreased safety awareness, impaired judgment, visual impairment and fall risk requiring supervision assistance for bed mobility, supervision to steadying assistance for transfers and supervision assistance for ambulation with RW  Pt's clinical presentation is currently unpredictable given the functional mobility deficits above, especially weakness, decreased endurance, gait deviations, pain, decreased activity tolerance, decreased functional mobility tolerance, decreased safety awareness, impaired judgement and decreased cognition, coupled with fall risks as indicated by AM-PAC 6-Clicks: 70/17 as well as impaired balance, impaired judgement, decreased safety awareness, altered vision and decreased cognition and combined with medical complications of hypotension, pain impacting overall mobility status and fear/retreat  Pt's PMHx and comorbidities that may affect physical performance and progress include: HTN, CVA and limited vision  Personal factors affecting pt at time of IE include: step(s) to enter environment, advanced age, inability to perform ADLs, inability to navigate level surfaces without external assistance and inability to ambulate household distances  Pt will benefit from continued skilled PT services to address deficits as defined above and to maximize level of functional mobility to facilitate return toward PLOF and improved QOL  From PT/mobility standpoint, recommendation at time of d/c would be Home PT, home with family support and with walker pending progress in order to reduce fall risk and maximize pt's functional independence and consistency with mobility in order to facilitate return to PLOF  Recommend ther ex next 1-2 sessions  Barriers to Discharge: Decreased caregiver support     Recommendation: Home with family support, Home PT          See flowsheet documentation for full assessment

## 2020-03-12 NOTE — CONSULTS
Consult received for stroke pathway  LDL levels elevated  Pt and family report consumption of snack foods through out the day and minimal physical activity  Pt and family did not have nutrition questions at this time  Given advanced age, will provide diet ed as requested by pt  Will continue to follow

## 2020-03-12 NOTE — OCCUPATIONAL THERAPY NOTE
Occupational Therapy Evaluation     Patient Name: Gomez Ferrera  RFXHO'P Date: 3/12/2020  Problem List  Principal Problem:    Visual disturbance  Active Problems:    Near syncope    Weakness    Past Medical History  Past Medical History:   Diagnosis Date    Hypertension     Stroke [facility])      Past Surgical History  History reviewed  No pertinent surgical history  03/12/20 1118   Note Type   Note type Eval only   Pain Assessment   Pain Assessment Tool 0-10   Pain Score No Pain   Home Living   Type of Home House   Home Layout One level  (1 ALFONSO L HR)   Bathroom Shower/Tub Tub/shower unit   Bathroom Toilet Raised   Bathroom Equipment Tub transfer bench;Grab bars in shower;Grab bars around toilet   Bathroom Accessibility   (Pt has grab bars on wall d/t inability to fit RW in bathroom)   Home Equipment Walker;Cane;Wheelchair-manual  (rollator)   Additional Comments Pt reports living in a 1 story home with 1 ALFONSO L HR with her son  Pt ambulates usually ambulating in cane/rollator in the home  Pt completes ADLs @ Mod I although has assistance from [facility] aides that assist with bathing and housekeeping tasks 3x/wk  Pt has a transport chair and a manual w/c for long distance/community use  Prior Function   Lives With Son  (son lives in the basement)   Josiane Jay Help From Family   ADL Assistance Independent  (has assistance from aid 3x/week for bathing and cleaning)   IADLs Needs assistance   Falls in the last 6 months 0   Comments Pt reports completing ADLs @ Mod I although has assistance for bathing  Pt has assistance for all IADL tasks and community mobility  Lifestyle   Autonomy Pt completing ADLs and functional mobility @ Mod I   Reciprocal Relationships Pt lives at home with her son and has other family close by   Service to Others Pt is retired   Intrinsic Gratification Pt enjoys completing word searches     ADL   Where Assessed Edge of bed   UB Dressing Assistance 5  Supervision/Setup   LB Dressing Assistance   (CGA)   LB Dressing Deficit Pull up over hips;Steadying; Requires assistive device for steadying;Verbal cueing;Supervision/safety; Increased time to complete; Don/doff R sock; Don/doff L sock   Toileting Assistance    (CGA)   Toileting Deficit Steadying;Verbal cueing;Supervison/safety; Increased time to complete;Grab bar use;Clothing management up;Perineal hygiene   Additional Comments with use of RW for UB support   Bed Mobility   Supine to Sit 5  Supervision   Additional items Increased time required   Transfers   Sit to Stand 5  Supervision   Additional items Verbal cues; Increased time required   Stand to Sit 5  Supervision   Additional items Verbal cues; Increased time required   Stand pivot   (CGA)   Additional items Verbal cues; Increased time required   Additional Comments vc'ing for hand placement and safety during functional transfers   Balance   Static Sitting Fair +   Dynamic Sitting Fair   Static Standing Fair   Dynamic Standing Fair -   Activity Tolerance   Activity Tolerance Patient tolerated treatment well;Patient limited by fatigue   Medical Staff Made Aware Spoke with Fernando KHALIL Assessment   RUE Assessment WFL   LUE Assessment   LUE Assessment WFL   Hand Function   Gross Motor Coordination Functional   Fine Motor Coordination Functional   Sensation   Light Touch No apparent deficits   Vision-Basic Assessment   Current Vision Wears glasses only for reading   Visual History   (L side visual loss d/t late CVA (2006))   Vision - Complex Assessment   Tracking   (able to track in all planes with decreased smoothness  )   Cognition   Overall Cognitive Status Impaired   Arousal/Participation Alert; Responsive; Cooperative   Attention Attends with cues to redirect   Orientation Level Oriented to person;Oriented to time;Disoriented to situation;Disoriented to place  ("at the nursing home " requires cues for place/situation)   Following Commands Follows one step commands with increased time or repetition   Comments Pt appropriately following commands and responding appropriately  Pt with occasional decrease safety and requiring vc'ing for technique and hand placement   Assessment   Limitation Decreased ADL status; Decreased UE strength;Decreased Safe judgement during ADL;Decreased cognition;Decreased endurance;Decreased self-care trans;Decreased high-level ADLs   Prognosis Good   Assessment Pt is a 89 [facility] y/o F admitted to [address] [place] [address] under observation 3/11/2020 d/t experiencing a near-syncope episode with visual disturbance noted  Dx: visual disturbance  Pt with PMHx impacting performance during functional tasks including: HTN, past CVA  Pt reports living at home in a 1 story home with her son  Pt has 1 ALFONSO L HR into home  Pt ambulates with use of cane or rollator in the home and has a transport chair and a manual w/c for long distance community use PRN  Pt has a tub bench and grab bars in shower as well as grab bars around the toilet, raised toilet seat and grab bars on the bathroom walls d/t Pt's inability to fit rollator into restroom  Pt completes ADLs and functional mobility @ Mod I although has assistance from [facility] aides 3x/wk for bathing and housekeeping tasks  Pt has assistance for all other IADL tasks  On evaluation, Pt completing supine>sit @ S with HOB flat and bed rails  Pt completing LB dressing @ CGA with extended time PRN for donning socks  Pt requiring CGA for CM while standing with UB support from RW PRN  Pt completing UB ADLs @ S after set-up  Pt completing STS and SPT transfers @ S-CGA with vc'ing for hand placement and safety  Pt's vision assessed while seated EOB  Pt able to track finger in all planes with decreased smoothness and slight loss of finger into L visual field (d/t L visual field cut d/t past CVA, 2006)  Pt able to read word in word search and reports no blurriness at this time  Pt scoring 55/100 on Barthel Index   Pt presents with decrease activity tolerance, decrease standing balance, decrease performance during ADL tasks, decrease safety awareness , decrease BUE ROM, decrease generalized strength, decrease activity engagement and decrease performance during functional transfers  Pt would benefit from continued acute OT services to address deficits as well as HHOT with continued support and assistance from family PRN upon d/c from [address] [place] [address]  Plan   Treatment Interventions ADL retraining;Functional transfer training;UE strengthening/ROM; Endurance training;Cognitive reorientation;Patient/family training;Equipment evaluation/education; Neuromuscular reeducation; Compensatory technique education;Continued evaluation; Energy conservation; Activityengagement   Goal Expiration Date 03/22/20   OT Frequency 3-5x/wk   Recommendation   Recommendation   ([address] with continued assistance from family)   OT Discharge Recommendation   ([address] [place] [address] with continued assistance from family)   Barthel Index   Feeding 10   Bathing 0   Grooming Score 5   Dressing Score 5   Bladder Score 10   Bowels Score 10   Toilet Use Score 5   Transfers (Bed/Chair) Score 10   Mobility (Level Surface) Score 0   Stairs Score 0   Barthel Index Score 55       Pt goals to be met by 3/22/2020    1  Pt will demonstrate ability to complete LB dressing @ Mod I in order to increase safety and independence during meaningful tasks  2  Pt will demonstrate ability to agata/doff socks/shoes while sitting EOB @ Mod I in order to increase safety and independence during meaningful tasks  3  Pt will demonstrate ability to complete toileting tasks including CM and pericare @ Mod I in order to increase safety and independence during meaningful tasks  4  Pt will demonstrate ability to complete EOB, chair, toilet/commode transfers @ Mod I in order to increase performance and participation during functional tasks  5  Pt will demonstrate ability to stand for 10 minutes while maintaining g balance with use of RW for UB support PRN    6  Pt will demonstrate ability to tolerate 30-35 minute OT session with no vc'ing for deep breathing or use of energy conservation techniques in order to increase activity tolerance during functional tasks  7  Pt will demonstrate Good carryover of use of energy conservation/compensatory strategies during ADLs and functional tasks in order to increase safety and reduce risk for falls  8  Pt will demonstrate Good attention and participation in continued evaluation of functional ambulation house hold distances in order to assist with safe d/c planning  9  Pt will attend to continued cognitive assessments 100% of the time in order to provide most appropriate d/c recommendations  10  Pt will follow 100% simple 2-step commands and be A&O x4 consistently with environmental cues to increase participation in functional activities  11  Pt will identify 3 areas of interest/hobbies and 1 intervention on how to incorporate into daily life in order to increase interaction with environment and peers as well as increase participation in meaningful tasks  12  Pt will demonstrate 100% carryover of BUE HEP in order to increase BUE MS and increase performance during functional tasks upon d/c home      Romina Null, OTR/L

## 2020-03-12 NOTE — PLAN OF CARE
Problem: Potential for Falls  Goal: Patient will remain free of falls  Description  INTERVENTIONS:  - Assess patient frequently for physical needs  -  Identify cognitive and physical deficits and behaviors that affect risk of falls    -  [place] fall precautions as indicated by assessment   - Educate patient/family on patient safety including physical limitations  - Instruct patient to call for assistance with activity based on assessment  - Modify environment to reduce risk of injury  - Consider OT/PT consult to assist with strengthening/mobility  Outcome: Progressing     Problem: PAIN - ADULT  Goal: Verbalizes/displays adequate comfort level or baseline comfort level  Description  Interventions:  - Encourage patient to monitor pain and request assistance  - Assess pain using appropriate pain scale  - Administer analgesics based on type and severity of pain and evaluate response  - Implement non-pharmacological measures as appropriate and evaluate response  - Consider cultural and social influences on pain and pain management  - Notify physician/advanced practitioner if interventions unsuccessful or patient reports new pain  Outcome: Progressing     Problem: INFECTION - ADULT  Goal: Absence or prevention of progression during hospitalization  Description  INTERVENTIONS:  - Assess and monitor for signs and symptoms of infection  - Monitor lab/diagnostic results  - Monitor all insertion sites, i e  indwelling lines, tubes, and drains  - Monitor endotracheal if appropriate and nasal secretions for changes in amount and color  - [place] appropriate cooling/warming therapies per order  - Administer medications as ordered  - Instruct and encourage patient and family to use good hand hygiene technique  - Identify and instruct in appropriate isolation precautions for identified infection/condition  Outcome: Progressing  Goal: Absence of fever/infection during neutropenic period  Description  INTERVENTIONS:  - Monitor WBC    Outcome: Progressing     Problem: SAFETY ADULT  Goal: Maintain or return to baseline ADL function  Description  INTERVENTIONS:  -  Assess patient's ability to carry out ADLs; assess patient's baseline for ADL function and identify physical deficits which impact ability to perform ADLs (bathing, care of mouth/teeth, toileting, grooming, dressing, etc )  - Assess/evaluate cause of self-care deficits   - Assess range of motion  - Assess patient's mobility; develop plan if impaired  - Assess patient's need for assistive devices and provide as appropriate  - Encourage maximum independence but intervene and supervise when necessary  - Involve family in performance of ADLs  - Assess for home care needs following discharge   - Consider OT consult to assist with ADL evaluation and planning for discharge  - Provide patient education as appropriate  Outcome: Progressing  Goal: Maintain or return mobility status to optimal level  Description  INTERVENTIONS:  - Assess patient's baseline mobility status (ambulation, transfers, stairs, etc )    - Identify cognitive and physical deficits and behaviors that affect mobility  - Identify mobility aids required to assist with transfers and/or ambulation (gait belt, sit-to-stand, lift, walker, cane, etc )  - [place] fall precautions as indicated by assessment  - Record patient progress and toleration of activity level on Mobility SBAR; progress patient to next Phase/Stage  - Instruct patient to call for assistance with activity based on assessment  - Consider rehabilitation consult to assist with strengthening/weightbearing, etc   Outcome: Progressing     Problem: DISCHARGE PLANNING  Goal: Discharge to home or other facility with appropriate resources  Description  INTERVENTIONS:  - Identify barriers to discharge w/patient and caregiver  - Arrange for needed discharge resources and transportation as appropriate  - Identify discharge learning needs (meds, wound care, etc )  - Arrange for interpretive services to assist at discharge as needed  - Refer to Case Management Department for coordinating discharge planning if the patient needs post-hospital services based on physician/advanced practitioner order or complex needs related to functional status, cognitive ability, or social support system  Outcome: Progressing     Problem: Knowledge Deficit  Goal: Patient/family/caregiver demonstrates understanding of disease process, treatment plan, medications, and discharge instructions  Description  Complete learning assessment and assess knowledge base  Interventions:  - Provide teaching at level of understanding  - Provide teaching via preferred learning methods  Outcome: Progressing     Problem: Neurological Deficit  Goal: Neurological status is stable or improving  Description  Interventions:  - Monitor and assess patient's level of consciousness, motor function, sensory function, and level of assistance needed for ADLs  - Monitor and report changes from baseline  Collaborate with interdisciplinary team to initiate plan and implement interventions as ordered  - Provide and maintain a safe environment  - Consider seizure precautions  - Consider fall precautions  - Consider aspiration precautions  - Consider bleeding precautions  Outcome: Progressing     Problem: Activity Intolerance/Impaired Mobility  Goal: Mobility/activity is maintained at optimum level for patient  Description  Interventions:  - Assess and monitor patient  barriers to mobility and need for assistive/adaptive devices  - Assess patient's emotional response to limitations  - Collaborate with interdisciplinary team and initiate plans and interventions as ordered  - Encourage independent activity per ability   - Maintain proper body alignment  - Perform active/passive rom as tolerated/ordered    - Plan activities to conserve energy   - Turn patient as appropriate  Outcome: Progressing     Problem: Communication Impairment  Goal: Ability to express needs and understand communication  Description  Assess patient's communication skills and ability to understand information  Patient will demonstrate use of effective communication techniques, alternative methods of communication and understanding even if not able to speak  - Encourage communication and provide alternate methods of communication as needed  - Collaborate with case management/ for discharge needs  - Include patient/family/caregiver in decisions related to communication  Outcome: Progressing     Problem: Nutrition  Goal: Nutrition/Hydration status is improving  Description  Monitor and assess patient's nutrition/hydration status for malnutrition (ex- brittle hair, bruises, dry skin, pale skin and conjunctiva, muscle wasting, smooth red tongue, and disorientation)  Collaborate with interdisciplinary team and initiate plan and interventions as ordered  Monitor patient's weight and dietary intake as ordered or per policy  Utilize nutrition screening tool and intervene per policy  Determine patient's food preferences and provide high-protein, high-caloric foods as appropriate  - Assist patient with eating   - Allow adequate time for meals   - Encourage patient to take dietary supplement as ordered  - Collaborate with clinical nutritionist   - Include patient/family/caregiver in decisions related to nutrition    Outcome: Progressing

## 2020-03-13 ENCOUNTER — APPOINTMENT (INPATIENT)
Dept: NON INVASIVE DIAGNOSTICS | Facility: HOSPITAL | Age: 85
DRG: 312 | End: 2020-03-13
Payer: MEDICARE

## 2020-03-13 VITALS
WEIGHT: 167.55 LBS | SYSTOLIC BLOOD PRESSURE: 175 MMHG | DIASTOLIC BLOOD PRESSURE: 85 MMHG | OXYGEN SATURATION: 91 % | HEART RATE: 71 BPM | RESPIRATION RATE: 16 BRPM | HEIGHT: 63 IN | BODY MASS INDEX: 29.69 KG/M2 | TEMPERATURE: 97.5 F

## 2020-03-13 LAB
ATRIAL RATE: 468 BPM
QRS AXIS: 6 DEGREES
QRSD INTERVAL: 74 MS
QT INTERVAL: 404 MS
QTC INTERVAL: 445 MS
T WAVE AXIS: 10 DEGREES
VENTRICULAR RATE: 73 BPM

## 2020-03-13 PROCEDURE — 93306 TTE W/DOPPLER COMPLETE: CPT

## 2020-03-13 PROCEDURE — 99238 HOSP IP/OBS DSCHRG MGMT 30/<: CPT | Performed by: INTERNAL MEDICINE

## 2020-03-13 RX ORDER — ASPIRIN 81 MG/1
81 TABLET, CHEWABLE ORAL DAILY
Qty: 30 TABLET | Refills: 0 | Status: SHIPPED | OUTPATIENT
Start: 2020-03-14 | End: 2022-05-25 | Stop reason: ALTCHOICE

## 2020-03-13 RX ORDER — ATORVASTATIN CALCIUM 40 MG/1
40 TABLET, FILM COATED ORAL DAILY
Qty: 30 TABLET | Refills: 0 | Status: SHIPPED | OUTPATIENT
Start: 2020-03-13 | End: 2022-05-25 | Stop reason: ALTCHOICE

## 2020-03-13 RX ORDER — DIAPER,BRIEF,INFANT-TODD,DISP
EACH MISCELLANEOUS 4 TIMES DAILY PRN
Qty: 30 G | Refills: 0 | Status: SHIPPED | OUTPATIENT
Start: 2020-03-13

## 2020-03-13 RX ADMIN — ASPIRIN 81 MG 81 MG: 81 TABLET ORAL at 09:27

## 2020-03-13 RX ADMIN — NIFEDIPINE 30 MG: 30 TABLET, FILM COATED, EXTENDED RELEASE ORAL at 09:27

## 2020-03-13 RX ADMIN — ENOXAPARIN SODIUM 40 MG: 40 INJECTION SUBCUTANEOUS at 09:27

## 2020-03-13 NOTE — PLAN OF CARE
Problem: Potential for Falls  Goal: Patient will remain free of falls  Description  INTERVENTIONS:  - Assess patient frequently for physical needs  -  Identify cognitive and physical deficits and behaviors that affect risk of falls  -  [place] fall precautions as indicated by assessment   - Educate patient/family on patient safety including physical limitations  - Instruct patient to call for assistance with activity based on assessment  - Modify environment to reduce risk of injury  - Consider OT/PT consult to assist with strengthening/mobility  Outcome: Adequate for Discharge     Problem: Neurological Deficit  Goal: Neurological status is stable or improving  Description  Interventions:  - Monitor and assess patient's level of consciousness, motor function, sensory function, and level of assistance needed for ADLs  - Monitor and report changes from baseline  Collaborate with interdisciplinary team to initiate plan and implement interventions as ordered  - Provide and maintain a safe environment  - Consider seizure precautions  - Consider fall precautions  - Consider aspiration precautions  - Consider bleeding precautions  Outcome: Adequate for Discharge     Problem: Communication Impairment  Goal: Ability to express needs and understand communication  Description  Assess patient's communication skills and ability to understand information  Patient will demonstrate use of effective communication techniques, alternative methods of communication and understanding even if not able to speak  - Encourage communication and provide alternate methods of communication as needed  - Collaborate with case management/ for discharge needs  - Include patient/family/caregiver in decisions related to communication    Outcome: Adequate for Discharge

## 2020-03-13 NOTE — UTILIZATION REVIEW
OBSERVATION 3/1120 @1645 CONVERTED TO INPATIENT 3/13/20 @0829 DUE TO VISUAL DISTURBANCE, NEAR SYNCOPE  03/13/20 0830  Inpatient Admission Once     Transfer Service: General Medicine       Question Answer Comment   Admitting Physician MENDOZA SANTIAGO    Level of Care Med Surg    Estimated length of stay More than 2 Midnights    Certification I certify that inpatient services are medically necessary for this patient for a duration of greater than two midnights  See H&P and MD Progress Notes for additional information about the patient's course of treatment          03/13/20 1729

## 2020-03-13 NOTE — NURSING NOTE
Pt meets discharge criteria, IV removed  Reviewed prescriptions (sent to pt's pharmacy), exit care, and discharge instructions with pt and pt's son; both verbalized understanding  All questions answered  Paperwork given to pt  Pt has belongings

## 2020-03-13 NOTE — SOCIAL WORK
Pt case was reviewed during CM rounds  Pt is medically cleared and will be d/c today  CM had scheduled f/u PCP appointment and arranged for Alexsanderrosa mariashellie Shannan to [facility] on 03/14/20  No additional CM needs  CM to remain available until d/c

## 2020-03-13 NOTE — ASSESSMENT & PLAN NOTE
Follow-up on MR negative for acute disease  Continue with supportive care  To consider statin with family in follow-up

## 2020-03-13 NOTE — ASSESSMENT & PLAN NOTE
Orthostatic blood pressure checks negative  Gentle hydration while in hospital  Observation  Physical therapy/occupational therapy  Patient for discharge home  Discussed with family at length

## 2020-03-13 NOTE — DISCHARGE SUMMARY
Discharge- Yordy Camilo 12/26/1920, 80 y o  female MRN: [id number]    Unit/Bed#: -01 Encounter: [id number]    Primary Care Provider: Syed Aj DO   Date and time admitted to hospital: 3/11/2020  3:04 PM        * Visual disturbance  Assessment & Plan  Follow-up on MR negative for acute disease  Continue with supportive care  To consider statin with family in follow-up  Weakness  Assessment & Plan  As above  Near syncope  Assessment & Plan  Orthostatic blood pressure checks negative  Gentle hydration while in hospital  Observation  Physical therapy/occupational therapy  Patient for discharge home  Discussed with family at length  Admission Date:   Admission Orders (From admission, onward)     Ordered        03/13/20 0829  Inpatient Admission  Once         03/11/20 1645  Place in Observation (expected length of stay for this patient is less than two midnights)  Once                     Admitting Diagnosis: TIA (transient ischemic attack) [G45 9]  Weakness [R53 1]  Visual disturbance [H53 9]  Subjective visual disturbance of right eye [H53 10]  Near syncope [R55]  Generalized weakness [R53 1]        Procedures Performed:   Orders Placed This Encounter   Procedures    ED ECG Documentation Only       Summary of Hospital Course: This is a 66-year-old female presents with symptoms of weakness/fatigue and visual disturbance  Patient also reports some transient lightheadedness during the event  She presents the hospital for further workup evaluation  She presents with complaints of lightheadedness/dizziness  She was brought in for further observation  After further discussion with the family was decided that she be admitted for further evaluation to rule out possible stroke  MRI imaging showed no acute pathology  · Visual disturbance-now since resolved  Possible TIA  Will discuss with family possible statin addition    Continue with anticoagulation and low-dose aspirin  · Weakness possibly related dehydration versus the above  · Near syncope-patient did not describe any overt sensation of feeling like she was going to pass out  Symptoms likely related to above  Very limited historian though  Condition at Discharge: fair         Discharge instructions/Information to patient and family:   See after visit summary for information provided to patient and family  Provisions for Follow-Up Care:  See after visit summary for information related to follow-up care and any pertinent home health orders  PCP: Yahir Lord DO    Disposition: Home    Planned Readmission: No    Discharge Statement   I spent 35 minutes discharging the patient  This time was spent on the day of discharge  I had direct contact with the patient on the day of discharge  Additional documentation is required if more than 30 minutes were spent on discharge  Discharge Medications:  See after visit summary for reconciled discharge medications provided to patient and family

## 2020-03-13 NOTE — PLAN OF CARE
Problem: Potential for Falls  Goal: Patient will remain free of falls  Description  INTERVENTIONS:  - Assess patient frequently for physical needs  -  Identify cognitive and physical deficits and behaviors that affect risk of falls    -  [place] fall precautions as indicated by assessment   - Educate patient/family on patient safety including physical limitations  - Instruct patient to call for assistance with activity based on assessment  - Modify environment to reduce risk of injury  - Consider OT/PT consult to assist with strengthening/mobility  Outcome: Progressing     Problem: PAIN - ADULT  Goal: Verbalizes/displays adequate comfort level or baseline comfort level  Description  Interventions:  - Encourage patient to monitor pain and request assistance  - Assess pain using appropriate pain scale  - Administer analgesics based on type and severity of pain and evaluate response  - Implement non-pharmacological measures as appropriate and evaluate response  - Consider cultural and social influences on pain and pain management  - Notify physician/advanced practitioner if interventions unsuccessful or patient reports new pain  Outcome: Progressing     Problem: INFECTION - ADULT  Goal: Absence or prevention of progression during hospitalization  Description  INTERVENTIONS:  - Assess and monitor for signs and symptoms of infection  - Monitor lab/diagnostic results  - Monitor all insertion sites, i e  indwelling lines, tubes, and drains  - Monitor endotracheal if appropriate and nasal secretions for changes in amount and color  - [place] appropriate cooling/warming therapies per order  - Administer medications as ordered  - Instruct and encourage patient and family to use good hand hygiene technique  - Identify and instruct in appropriate isolation precautions for identified infection/condition  Outcome: Progressing  Goal: Absence of fever/infection during neutropenic period  Description  INTERVENTIONS:  - Monitor WBC    Outcome: Progressing     Problem: SAFETY ADULT  Goal: Maintain or return to baseline ADL function  Description  INTERVENTIONS:  -  Assess patient's ability to carry out ADLs; assess patient's baseline for ADL function and identify physical deficits which impact ability to perform ADLs (bathing, care of mouth/teeth, toileting, grooming, dressing, etc )  - Assess/evaluate cause of self-care deficits   - Assess range of motion  - Assess patient's mobility; develop plan if impaired  - Assess patient's need for assistive devices and provide as appropriate  - Encourage maximum independence but intervene and supervise when necessary  - Involve family in performance of ADLs  - Assess for home care needs following discharge   - Consider OT consult to assist with ADL evaluation and planning for discharge  - Provide patient education as appropriate  Outcome: Progressing  Goal: Maintain or return mobility status to optimal level  Description  INTERVENTIONS:  - Assess patient's baseline mobility status (ambulation, transfers, stairs, etc )    - Identify cognitive and physical deficits and behaviors that affect mobility  - Identify mobility aids required to assist with transfers and/or ambulation (gait belt, sit-to-stand, lift, walker, cane, etc )  - [place] fall precautions as indicated by assessment  - Record patient progress and toleration of activity level on Mobility SBAR; progress patient to next Phase/Stage  - Instruct patient to call for assistance with activity based on assessment  - Consider rehabilitation consult to assist with strengthening/weightbearing, etc   Outcome: Progressing     Problem: DISCHARGE PLANNING  Goal: Discharge to home or other facility with appropriate resources  Description  INTERVENTIONS:  - Identify barriers to discharge w/patient and caregiver  - Arrange for needed discharge resources and transportation as appropriate  - Identify discharge learning needs (meds, wound care, etc )  - Arrange for interpretive services to assist at discharge as needed  - Refer to Case Management Department for coordinating discharge planning if the patient needs post-hospital services based on physician/advanced practitioner order or complex needs related to functional status, cognitive ability, or social support system  Outcome: Progressing     Problem: Knowledge Deficit  Goal: Patient/family/caregiver demonstrates understanding of disease process, treatment plan, medications, and discharge instructions  Description  Complete learning assessment and assess knowledge base  Interventions:  - Provide teaching at level of understanding  - Provide teaching via preferred learning methods  Outcome: Progressing     Problem: Neurological Deficit  Goal: Neurological status is stable or improving  Description  Interventions:  - Monitor and assess patient's level of consciousness, motor function, sensory function, and level of assistance needed for ADLs  - Monitor and report changes from baseline  Collaborate with interdisciplinary team to initiate plan and implement interventions as ordered  - Provide and maintain a safe environment  - Consider seizure precautions  - Consider fall precautions  - Consider aspiration precautions  - Consider bleeding precautions  Outcome: Progressing     Problem: Activity Intolerance/Impaired Mobility  Goal: Mobility/activity is maintained at optimum level for patient  Description  Interventions:  - Assess and monitor patient  barriers to mobility and need for assistive/adaptive devices  - Assess patient's emotional response to limitations  - Collaborate with interdisciplinary team and initiate plans and interventions as ordered  - Encourage independent activity per ability   - Maintain proper body alignment  - Perform active/passive rom as tolerated/ordered    - Plan activities to conserve energy   - Turn patient as appropriate  Outcome: Progressing     Problem: Communication Impairment  Goal: Ability to express needs and understand communication  Description  Assess patient's communication skills and ability to understand information  Patient will demonstrate use of effective communication techniques, alternative methods of communication and understanding even if not able to speak  - Encourage communication and provide alternate methods of communication as needed  - Collaborate with case management/ for discharge needs  - Include patient/family/caregiver in decisions related to communication  Outcome: Progressing     Problem: Nutrition  Goal: Nutrition/Hydration status is improving  Description  Monitor and assess patient's nutrition/hydration status for malnutrition (ex- brittle hair, bruises, dry skin, pale skin and conjunctiva, muscle wasting, smooth red tongue, and disorientation)  Collaborate with interdisciplinary team and initiate plan and interventions as ordered  Monitor patient's weight and dietary intake as ordered or per policy  Utilize nutrition screening tool and intervene per policy  Determine patient's food preferences and provide high-protein, high-caloric foods as appropriate  - Assist patient with eating   - Allow adequate time for meals   - Encourage patient to take dietary supplement as ordered  - Collaborate with clinical nutritionist   - Include patient/family/caregiver in decisions related to nutrition    Outcome: Progressing

## 2020-03-27 NOTE — PHYSICIAN ADVISOR
Current patient class: Inpatient  The patient is currently on Hospital Day: 3 at [place] [address]      The patient was admitted to the hospital at 0829 on 3/13/20 for the following diagnosis:  TIA (transient ischemic attack) [G45 9]  Weakness [R53 1]  Visual disturbance [H53 9]  Subjective visual disturbance of right eye [H53 10]  Near syncope [R55]  Generalized weakness [R53 1]       There is documentation in the medical record of an expected length of stay of at least 2 midnights  The patient is therefore expected to satisfy the 2 midnight benchmark and given the 2 midnight presumption is appropriate for INPATIENT ADMISSION  Given this expectation of a satisfying stay, CMS instructs us that the patient is most often appropriate for inpatient admission under part A provided medical necessity is documented in the chart  After review of the relevant documentation, labs, vital signs and test results, the patient is appropriate for INPATIENT ADMISSION  Admission to the hospital as an inpatient is a complex decision making process which requires the practitioner to consider the patients presenting complaint, history and physical examination and all relevant testing  With this in mind, in this case, the patient was deemed appropriate for INPATIENT ADMISSION  After review of the documentation and testing available at the time of the admission I concur with this clinical determination of medical necessity  Rationale is as follows: The patient is a 80 yrs old Female who presented to the ED at 3/11/2020  3:04 PM with a chief complaint of Weakness - Generalized (pt  lives with oldest son and has home health aid came for in home visit today and pt  stated she felt "weak and shakey", c/o black spot over right eye, pt  c/o pain to right leg, hx of cva, pt  on coumadin)   Patient was put on the stroke pathway on admission    The patient was still receiving gentle hydration the near syncopal episode and MRI was still pending on day 2  Given the need for further monitoring of neurological status the patient did cross the 2 midnight benchmark based on medical necessity and is inpatient status appropriate  The patients vitals on arrival were ED Triage Vitals   Temperature Pulse Respirations Blood Pressure SpO2   03/11/20 1505 03/11/20 1510 03/11/20 1510 03/11/20 1510 03/11/20 1510   97 8 °F (36 6 °C) 72 18 134/74 97 %      Temp Source Heart Rate Source Patient Position - Orthostatic VS BP Location FiO2 (%)   03/11/20 1505 03/11/20 1510 03/11/20 1510 03/11/20 1510 --   Temporal Monitor Sitting Left arm       Pain Score       03/11/20 1637       No Pain           Past Medical History:   Diagnosis Date    Hypertension     Stroke [facility])      History reviewed  No pertinent surgical history  Consults have been placed to:   IP CONSULT TO CASE MANAGEMENT  IP CONSULT TO NUTRITION SERVICES    Vitals:    03/12/20 2201 03/13/20 0055 03/13/20 0354 03/13/20 0911   BP: 145/81 140/80 165/98 (!) 175/85   BP Location:  Right arm     Pulse: 71 72 79 71   Resp: 17 16 16    Temp: (!) 96 8 °F (36 °C)  97 5 °F (36 4 °C)    TempSrc:       SpO2: 95%  94% 91%   Weight:       Height:           Most recent labs:    No results for input(s): WBC, HGB, HCT, PLT, K, NA, CALCIUM, BUN, CREATININE, LIPASE, AMYLASE, INR, TROPONINI, CKTOTAL, AST, ALT, ALKPHOS, BILITOT in the last 72 hours  Scheduled Meds:  Continuous Infusions:  No current facility-administered medications for this encounter  PRN Meds:      Surgical procedures (if appropriate):

## 2021-02-12 DIAGNOSIS — Z23 ENCOUNTER FOR IMMUNIZATION: ICD-10-CM

## 2022-05-25 ENCOUNTER — HOSPITAL ENCOUNTER (EMERGENCY)
Facility: HOSPITAL | Age: 87
Discharge: OTHER INSTITUTION | End: 2022-05-25
Attending: EMERGENCY MEDICINE
Payer: MEDICARE

## 2022-05-25 ENCOUNTER — APPOINTMENT (EMERGENCY)
Dept: CT IMAGING | Facility: HOSPITAL | Age: 87
End: 2022-05-25
Payer: MEDICARE

## 2022-05-25 ENCOUNTER — APPOINTMENT (EMERGENCY)
Dept: MRI IMAGING | Facility: HOSPITAL | Age: 87
End: 2022-05-25
Payer: MEDICARE

## 2022-05-25 VITALS
HEART RATE: 90 BPM | OXYGEN SATURATION: 95 % | BODY MASS INDEX: 21.01 KG/M2 | RESPIRATION RATE: 18 BRPM | WEIGHT: 118.61 LBS | DIASTOLIC BLOOD PRESSURE: 64 MMHG | SYSTOLIC BLOOD PRESSURE: 119 MMHG | TEMPERATURE: 97.2 F

## 2022-05-25 DIAGNOSIS — K92.2 GASTROINTESTINAL HEMORRHAGE, UNSPECIFIED GASTROINTESTINAL HEMORRHAGE TYPE: Primary | ICD-10-CM

## 2022-05-25 DIAGNOSIS — K85.90 ACUTE PANCREATITIS WITHOUT INFECTION OR NECROSIS, UNSPECIFIED PANCREATITIS TYPE: ICD-10-CM

## 2022-05-25 DIAGNOSIS — U07.1 COVID-19 VIRUS INFECTION: ICD-10-CM

## 2022-05-25 PROBLEM — R79.1 SUPRATHERAPEUTIC INR: Status: ACTIVE | Noted: 2022-05-25

## 2022-05-25 PROBLEM — N28.9 RENAL LESION: Status: ACTIVE | Noted: 2022-05-25

## 2022-05-25 PROBLEM — R19.7 DIARRHEA: Status: ACTIVE | Noted: 2022-05-25

## 2022-05-25 PROBLEM — N94.9 ADNEXAL CYST: Status: ACTIVE | Noted: 2022-05-25

## 2022-05-25 PROBLEM — E78.5 HYPERLIPIDEMIA: Status: ACTIVE | Noted: 2022-05-25

## 2022-05-25 PROBLEM — N32.89 BLADDER DISTENTION: Status: ACTIVE | Noted: 2022-05-25

## 2022-05-25 LAB
ABO GROUP BLD: NORMAL
ALBUMIN SERPL BCP-MCNC: 3.1 G/DL (ref 3.5–5)
ALP SERPL-CCNC: 72 U/L (ref 46–116)
ALT SERPL W P-5'-P-CCNC: 15 U/L (ref 12–78)
ANION GAP SERPL CALCULATED.3IONS-SCNC: 13 MMOL/L (ref 4–13)
APTT PPP: 72 SECONDS (ref 23–37)
AST SERPL W P-5'-P-CCNC: 15 U/L (ref 5–45)
ATRIAL RATE: 85 BPM
BACTERIA UR QL AUTO: ABNORMAL /HPF
BASOPHILS # BLD AUTO: 0.01 THOUSANDS/ΜL (ref 0–0.1)
BASOPHILS # BLD AUTO: 0.01 THOUSANDS/ΜL (ref 0–0.1)
BASOPHILS NFR BLD AUTO: 0 % (ref 0–1)
BASOPHILS NFR BLD AUTO: 0 % (ref 0–1)
BILIRUB SERPL-MCNC: 0.64 MG/DL (ref 0.2–1)
BILIRUB UR QL STRIP: NEGATIVE
BLD GP AB SCN SERPL QL: NEGATIVE
BUN SERPL-MCNC: 23 MG/DL (ref 5–25)
CALCIUM ALBUM COR SERPL-MCNC: 9.5 MG/DL (ref 8.3–10.1)
CALCIUM SERPL-MCNC: 8.8 MG/DL (ref 8.3–10.1)
CHLORIDE SERPL-SCNC: 104 MMOL/L (ref 100–108)
CLARITY UR: CLEAR
CO2 SERPL-SCNC: 25 MMOL/L (ref 21–32)
COLOR UR: YELLOW
CREAT SERPL-MCNC: 1.2 MG/DL (ref 0.6–1.3)
EOSINOPHIL # BLD AUTO: 0.06 THOUSAND/ΜL (ref 0–0.61)
EOSINOPHIL # BLD AUTO: 0.1 THOUSAND/ΜL (ref 0–0.61)
EOSINOPHIL NFR BLD AUTO: 1 % (ref 0–6)
EOSINOPHIL NFR BLD AUTO: 1 % (ref 0–6)
ERYTHROCYTE [DISTWIDTH] IN BLOOD BY AUTOMATED COUNT: 18.5 % (ref 11.6–15.1)
ERYTHROCYTE [DISTWIDTH] IN BLOOD BY AUTOMATED COUNT: 18.6 % (ref 11.6–15.1)
FLUAV RNA RESP QL NAA+PROBE: NEGATIVE
FLUBV RNA RESP QL NAA+PROBE: NEGATIVE
GFR SERPL CREATININE-BSD FRML MDRD: 36 ML/MIN/1.73SQ M
GLUCOSE SERPL-MCNC: 127 MG/DL (ref 65–140)
GLUCOSE UR STRIP-MCNC: NEGATIVE MG/DL
HCT VFR BLD AUTO: 30.8 % (ref 34.8–46.1)
HCT VFR BLD AUTO: 32.1 % (ref 34.8–46.1)
HGB BLD-MCNC: 9.4 G/DL (ref 11.5–15.4)
HGB BLD-MCNC: 9.7 G/DL (ref 11.5–15.4)
HGB UR QL STRIP.AUTO: ABNORMAL
IMM GRANULOCYTES # BLD AUTO: 0.18 THOUSAND/UL (ref 0–0.2)
IMM GRANULOCYTES # BLD AUTO: 0.2 THOUSAND/UL (ref 0–0.2)
IMM GRANULOCYTES NFR BLD AUTO: 2 % (ref 0–2)
IMM GRANULOCYTES NFR BLD AUTO: 2 % (ref 0–2)
INR PPP: 4.64 (ref 0.84–1.19)
KETONES UR STRIP-MCNC: ABNORMAL MG/DL
LACTATE SERPL-SCNC: 1.4 MMOL/L (ref 0.5–2)
LEUKOCYTE ESTERASE UR QL STRIP: NEGATIVE
LIPASE SERPL-CCNC: 5854 U/L (ref 73–393)
LYMPHOCYTES # BLD AUTO: 0.92 THOUSANDS/ΜL (ref 0.6–4.47)
LYMPHOCYTES # BLD AUTO: 1.01 THOUSANDS/ΜL (ref 0.6–4.47)
LYMPHOCYTES NFR BLD AUTO: 11 % (ref 14–44)
LYMPHOCYTES NFR BLD AUTO: 14 % (ref 14–44)
MCH RBC QN AUTO: 26.9 PG (ref 26.8–34.3)
MCH RBC QN AUTO: 27.2 PG (ref 26.8–34.3)
MCHC RBC AUTO-ENTMCNC: 30.2 G/DL (ref 31.4–37.4)
MCHC RBC AUTO-ENTMCNC: 30.5 G/DL (ref 31.4–37.4)
MCV RBC AUTO: 89 FL (ref 82–98)
MCV RBC AUTO: 89 FL (ref 82–98)
MONOCYTES # BLD AUTO: 0.96 THOUSAND/ΜL (ref 0.17–1.22)
MONOCYTES # BLD AUTO: 1.03 THOUSAND/ΜL (ref 0.17–1.22)
MONOCYTES NFR BLD AUTO: 13 % (ref 4–12)
MONOCYTES NFR BLD AUTO: 13 % (ref 4–12)
NEUTROPHILS # BLD AUTO: 5.12 THOUSANDS/ΜL (ref 1.85–7.62)
NEUTROPHILS # BLD AUTO: 6 THOUSANDS/ΜL (ref 1.85–7.62)
NEUTS SEG NFR BLD AUTO: 70 % (ref 43–75)
NEUTS SEG NFR BLD AUTO: 73 % (ref 43–75)
NITRITE UR QL STRIP: NEGATIVE
NON-SQ EPI CELLS URNS QL MICRO: ABNORMAL /HPF
NRBC BLD AUTO-RTO: 0 /100 WBCS
NRBC BLD AUTO-RTO: 0 /100 WBCS
P AXIS: 67 DEGREES
PH UR STRIP.AUTO: 6 [PH]
PLATELET # BLD AUTO: 287 THOUSANDS/UL (ref 149–390)
PLATELET # BLD AUTO: 304 THOUSANDS/UL (ref 149–390)
PMV BLD AUTO: 10.5 FL (ref 8.9–12.7)
PMV BLD AUTO: 10.7 FL (ref 8.9–12.7)
POTASSIUM SERPL-SCNC: 3.7 MMOL/L (ref 3.5–5.3)
PR INTERVAL: 242 MS
PROT SERPL-MCNC: 7.5 G/DL (ref 6.4–8.2)
PROT UR STRIP-MCNC: NEGATIVE MG/DL
PROTHROMBIN TIME: 42.3 SECONDS (ref 11.6–14.5)
QRS AXIS: 10 DEGREES
QRSD INTERVAL: 88 MS
QT INTERVAL: 382 MS
QTC INTERVAL: 454 MS
RBC # BLD AUTO: 3.45 MILLION/UL (ref 3.81–5.12)
RBC # BLD AUTO: 3.61 MILLION/UL (ref 3.81–5.12)
RBC #/AREA URNS AUTO: ABNORMAL /HPF
RH BLD: NEGATIVE
RSV RNA RESP QL NAA+PROBE: NEGATIVE
SARS-COV-2 RNA RESP QL NAA+PROBE: POSITIVE
SODIUM SERPL-SCNC: 142 MMOL/L (ref 136–145)
SP GR UR STRIP.AUTO: 1.02 (ref 1–1.03)
SPECIMEN EXPIRATION DATE: NORMAL
T WAVE AXIS: 2 DEGREES
UROBILINOGEN UR QL STRIP.AUTO: 0.2 E.U./DL
VENTRICULAR RATE: 85 BPM
WBC # BLD AUTO: 7.38 THOUSAND/UL (ref 4.31–10.16)
WBC # BLD AUTO: 8.22 THOUSAND/UL (ref 4.31–10.16)
WBC #/AREA URNS AUTO: ABNORMAL /HPF

## 2022-05-25 PROCEDURE — G1004 CDSM NDSC: HCPCS

## 2022-05-25 PROCEDURE — 85730 THROMBOPLASTIN TIME PARTIAL: CPT | Performed by: EMERGENCY MEDICINE

## 2022-05-25 PROCEDURE — 80053 COMPREHEN METABOLIC PANEL: CPT | Performed by: EMERGENCY MEDICINE

## 2022-05-25 PROCEDURE — 86900 BLOOD TYPING SEROLOGIC ABO: CPT | Performed by: EMERGENCY MEDICINE

## 2022-05-25 PROCEDURE — 99285 EMERGENCY DEPT VISIT HI MDM: CPT | Performed by: EMERGENCY MEDICINE

## 2022-05-25 PROCEDURE — 36415 COLL VENOUS BLD VENIPUNCTURE: CPT | Performed by: EMERGENCY MEDICINE

## 2022-05-25 PROCEDURE — 85025 COMPLETE CBC W/AUTO DIFF WBC: CPT | Performed by: EMERGENCY MEDICINE

## 2022-05-25 PROCEDURE — 85610 PROTHROMBIN TIME: CPT | Performed by: EMERGENCY MEDICINE

## 2022-05-25 PROCEDURE — 96375 TX/PRO/DX INJ NEW DRUG ADDON: CPT

## 2022-05-25 PROCEDURE — 86901 BLOOD TYPING SEROLOGIC RH(D): CPT | Performed by: EMERGENCY MEDICINE

## 2022-05-25 PROCEDURE — 99285 EMERGENCY DEPT VISIT HI MDM: CPT

## 2022-05-25 PROCEDURE — 93005 ELECTROCARDIOGRAM TRACING: CPT

## 2022-05-25 PROCEDURE — 74181 MRI ABDOMEN W/O CONTRAST: CPT

## 2022-05-25 PROCEDURE — 99203 OFFICE O/P NEW LOW 30 MIN: CPT | Performed by: FAMILY MEDICINE

## 2022-05-25 PROCEDURE — 0241U HB NFCT DS VIR RESP RNA 4 TRGT: CPT | Performed by: EMERGENCY MEDICINE

## 2022-05-25 PROCEDURE — 86850 RBC ANTIBODY SCREEN: CPT | Performed by: EMERGENCY MEDICINE

## 2022-05-25 PROCEDURE — C9113 INJ PANTOPRAZOLE SODIUM, VIA: HCPCS | Performed by: EMERGENCY MEDICINE

## 2022-05-25 PROCEDURE — 81001 URINALYSIS AUTO W/SCOPE: CPT | Performed by: EMERGENCY MEDICINE

## 2022-05-25 PROCEDURE — 74176 CT ABD & PELVIS W/O CONTRAST: CPT

## 2022-05-25 PROCEDURE — 83690 ASSAY OF LIPASE: CPT | Performed by: EMERGENCY MEDICINE

## 2022-05-25 PROCEDURE — 83605 ASSAY OF LACTIC ACID: CPT | Performed by: EMERGENCY MEDICINE

## 2022-05-25 PROCEDURE — 96361 HYDRATE IV INFUSION ADD-ON: CPT

## 2022-05-25 PROCEDURE — 96365 THER/PROPH/DIAG IV INF INIT: CPT

## 2022-05-25 RX ORDER — PANTOPRAZOLE SODIUM 40 MG/1
40 INJECTION, POWDER, FOR SOLUTION INTRAVENOUS ONCE
Status: COMPLETED | OUTPATIENT
Start: 2022-05-25 | End: 2022-05-25

## 2022-05-25 RX ORDER — DOCUSATE SODIUM 100 MG/1
100 CAPSULE, LIQUID FILLED ORAL 2 TIMES DAILY
Status: DISCONTINUED | OUTPATIENT
Start: 2022-05-25 | End: 2022-05-25

## 2022-05-25 RX ORDER — DEXTROSE AND SODIUM CHLORIDE 5; .45 G/100ML; G/100ML
100 INJECTION, SOLUTION INTRAVENOUS CONTINUOUS
Status: DISCONTINUED | OUTPATIENT
Start: 2022-05-25 | End: 2022-05-25 | Stop reason: HOSPADM

## 2022-05-25 RX ORDER — ONDANSETRON 2 MG/ML
4 INJECTION INTRAMUSCULAR; INTRAVENOUS EVERY 4 HOURS PRN
Status: DISCONTINUED | OUTPATIENT
Start: 2022-05-25 | End: 2022-05-25 | Stop reason: HOSPADM

## 2022-05-25 RX ORDER — HEPARIN SODIUM 5000 [USP'U]/ML
5000 INJECTION, SOLUTION INTRAVENOUS; SUBCUTANEOUS EVERY 8 HOURS SCHEDULED
Status: DISCONTINUED | OUTPATIENT
Start: 2022-05-25 | End: 2022-05-25

## 2022-05-25 RX ORDER — SODIUM CHLORIDE, SODIUM LACTATE, POTASSIUM CHLORIDE, CALCIUM CHLORIDE 600; 310; 30; 20 MG/100ML; MG/100ML; MG/100ML; MG/100ML
125 INJECTION, SOLUTION INTRAVENOUS CONTINUOUS
Status: DISCONTINUED | OUTPATIENT
Start: 2022-05-25 | End: 2022-05-25

## 2022-05-25 RX ORDER — NIFEDIPINE 30 MG/1
30 TABLET, EXTENDED RELEASE ORAL
Status: DISCONTINUED | OUTPATIENT
Start: 2022-05-25 | End: 2022-05-25

## 2022-05-25 RX ORDER — ACETAMINOPHEN 325 MG/1
650 TABLET ORAL EVERY 6 HOURS PRN
Status: DISCONTINUED | OUTPATIENT
Start: 2022-05-25 | End: 2022-05-25 | Stop reason: HOSPADM

## 2022-05-25 RX ORDER — SENNOSIDES 8.6 MG
1 TABLET ORAL
Status: DISCONTINUED | OUTPATIENT
Start: 2022-05-25 | End: 2022-05-25

## 2022-05-25 RX ADMIN — PANTOPRAZOLE SODIUM 40 MG: 40 INJECTION, POWDER, FOR SOLUTION INTRAVENOUS at 08:03

## 2022-05-25 RX ADMIN — SODIUM CHLORIDE, SODIUM LACTATE, POTASSIUM CHLORIDE, AND CALCIUM CHLORIDE 125 ML/HR: .6; .31; .03; .02 INJECTION, SOLUTION INTRAVENOUS at 15:44

## 2022-05-25 RX ADMIN — DEXTROSE AND SODIUM CHLORIDE 100 ML/HR: 5; .45 INJECTION, SOLUTION INTRAVENOUS at 15:39

## 2022-05-25 RX ADMIN — NIFEDIPINE 30 MG: 30 TABLET, FILM COATED, EXTENDED RELEASE ORAL at 16:42

## 2022-05-25 RX ADMIN — PHYTONADIONE 10 MG: 10 INJECTION, EMULSION INTRAMUSCULAR; INTRAVENOUS; SUBCUTANEOUS at 14:29

## 2022-05-25 RX ADMIN — SODIUM CHLORIDE 1000 ML: 0.9 INJECTION, SOLUTION INTRAVENOUS at 07:59

## 2022-05-25 NOTE — ASSESSMENT & PLAN NOTE
Presented with diarrhea   CT shows fecal impaction - may be overflow diarhea   Patient was disimpacted in the ER with evidence of scant BRBPR   Encourage oral hydration and continue bowel reg

## 2022-05-25 NOTE — ED NOTES
Steven Sanchez  [address]                               Dr Vern Michel                           Report: [phone]                            [phone]  [address], RN  05/25/22 9839

## 2022-05-25 NOTE — ASSESSMENT & PLAN NOTE
Incidentally COVID-19 POS in ER   Patient on room air   Mild tx pathway   Supportive measures   Check d-dimer

## 2022-05-25 NOTE — ED NOTES
Pt trying to have BM when taken to MRI, Pt had large ball of feces protruding from rectum, assisted in removing ball of feces from rectum, scant amt bright red blood noted at edge of rectum       [address] [place]  RN  05/25/22 7278

## 2022-05-25 NOTE — ED NOTES
[facility] had cancellation, Transport will arrive an hour early (2000hrs)      [address], RN  05/25/22 1190

## 2022-05-25 NOTE — ASSESSMENT & PLAN NOTE
Pt presented with diarrhea  Lipase greater than 5000     CT A/P shows intrahepatic and extrahepatic biliary ductal dilatation possible choledocholithiasis, dilatation of the pancreatic duct and calcifications   MRCP shows mild hepatic steatosis, markedly dilated common bile duct and pancreatic duct with calculi in the ampullary portion of the CBD, markedly dilated pancreatic up dilation of the side branches  Fortunately liver enzymes and bilirubin are normal  Plan for patient to be transferred to [facility]  Per now would keep patient NPO and start IV fluids  Pain control  Eventual GI consult

## 2022-05-25 NOTE — ED PROVIDER NOTES
History  Chief Complaint   Patient presents with    Diarrhea     Since last night, dark in color  From home  Complaining of diarrhea since last night  Dark in color  On Coumadin  No fevers or chills  No rash  Decreased appetite  Increased weakness  No recent cough or cold symptoms  No recent antibiotics  History provided by:  Patient and EMS personnel   used: No    Diarrhea  Diarrhea characteristics: Dark loose stools  Severity:  Mild  Onset quality:  Gradual  Duration:  1 day  Timing:  Constant  Progression:  Unchanged  Relieved by:  Nothing  Worsened by:  Nothing  Ineffective treatments:  None tried  Associated symptoms: abdominal pain    Associated symptoms: no chills, no diaphoresis, no fever, no headaches, no myalgias and no vomiting        Prior to Admission Medications   Prescriptions Last Dose Informant Patient Reported? Taking? NIFEdipine ER (ADALAT CC) 30 MG 24 hr tablet 5/24/2022 at Unknown time  Yes Yes   Sig: Take 30 mg by mouth daily with dinner    hydrocortisone 1 % cream   No No   Sig: Apply topically 4 (four) times a day as needed for rash   warfarin (COUMADIN) 2 5 mg tablet 5/24/2022 at Unknown time  Yes Yes   Sig: Take 2 5 mg by mouth daily      Facility-Administered Medications: None       Past Medical History:   Diagnosis Date    Hypertension     Stroke [facility])        History reviewed  No pertinent surgical history  History reviewed  No pertinent family history  I have reviewed and agree with the history as documented  E-Cigarette/Vaping    E-Cigarette Use Never User      E-Cigarette/Vaping Substances     Social History     Tobacco Use    Smoking status: Never Smoker    Smokeless tobacco: Never Used   Vaping Use    Vaping Use: Never used   Substance Use Topics    Alcohol use: Never    Drug use: Never       Review of Systems   Constitutional: Positive for appetite change  Negative for chills, diaphoresis and fever     HENT: Negative for ear pain, hearing loss, sore throat, trouble swallowing and voice change  Eyes: Negative for pain and discharge  Respiratory: Negative for cough, shortness of breath and wheezing  Cardiovascular: Negative for chest pain and palpitations  Gastrointestinal: Positive for abdominal pain and diarrhea  Negative for blood in stool, constipation, nausea and vomiting  Genitourinary: Negative for dysuria, flank pain, frequency and hematuria  Musculoskeletal: Negative for joint swelling, myalgias, neck pain and neck stiffness  Skin: Negative for rash and wound  Neurological: Positive for weakness  Negative for dizziness, seizures, syncope, facial asymmetry and headaches  Psychiatric/Behavioral: Negative for hallucinations, self-injury and suicidal ideas  All other systems reviewed and are negative  Physical Exam  Physical Exam  Vitals and nursing note reviewed  Constitutional:       General: She is not in acute distress  Appearance: She is well-developed  HENT:      Head: Normocephalic and atraumatic  Right Ear: External ear normal       Left Ear: External ear normal    Eyes:      General: No scleral icterus  Right eye: No discharge  Left eye: No discharge  Extraocular Movements: Extraocular movements intact  Conjunctiva/sclera: Conjunctivae normal    Cardiovascular:      Rate and Rhythm: Normal rate and regular rhythm  Heart sounds: Normal heart sounds  No murmur heard  Pulmonary:      Effort: Pulmonary effort is normal       Breath sounds: Normal breath sounds  No wheezing or rales  Abdominal:      General: Bowel sounds are normal  There is no distension  Palpations: Abdomen is soft  Tenderness: There is abdominal tenderness (Tender diffusely)  There is no guarding or rebound  Comments: Diffusely tender but greatest in the epigastric and right upper quadrant region   Genitourinary:     Comments: Stool ball noted in rectum  Brown stool    Heme positive  Musculoskeletal:         General: No deformity  Normal range of motion  Cervical back: Normal range of motion and neck supple  Skin:     General: Skin is warm and dry  Findings: No rash  Neurological:      General: No focal deficit present  Mental Status: She is alert and oriented to person, place, and time  Cranial Nerves: No cranial nerve deficit  Psychiatric:         Mood and Affect: Mood normal          Behavior: Behavior normal          Thought Content:  Thought content normal          Judgment: Judgment normal          Vital Signs  ED Triage Vitals [05/25/22 0743]   Temperature Pulse Respirations Blood Pressure SpO2   (!) 97 2 °F (36 2 °C) 87 16 144/64 97 %      Temp Source Heart Rate Source Patient Position - Orthostatic VS BP Location FiO2 (%)   Temporal Monitor Lying Left arm --      Pain Score       No Pain           Vitals:    05/25/22 1400 05/25/22 1430 05/25/22 1600 05/25/22 1730   BP: 115/63 120/63 153/69 150/72   Pulse: 79 88 82 92   Patient Position - Orthostatic VS:    Lying         Visual Acuity      ED Medications  Medications   dextrose 5 % and sodium chloride 0 45 % infusion (100 mL/hr Intravenous New Bag 5/25/22 1539)   acetaminophen (TYLENOL) tablet 650 mg (has no administration in time range)   ondansetron (ZOFRAN) injection 4 mg (has no administration in time range)   sodium chloride 0 9 % bolus 1,000 mL (0 mL Intravenous Stopped 5/25/22 1018)   pantoprazole (PROTONIX) injection 40 mg (40 mg Intravenous Given 5/25/22 0803)   phytonadione (AQUA-MEPHYTON) 10 mg/mL 10 mg in sodium chloride 0 9 % 50 mL IVPB (0 mg Intravenous Stopped 5/25/22 1529)       Diagnostic Studies  Results Reviewed     Procedure Component Value Units Date/Time    D-dimer, quantitative [[id number]]     Lab Status: No result Specimen: Blood     Urine Microscopic [[id number]]  (Abnormal) Collected: 05/25/22 1337    Lab Status: Final result Specimen: Urine, Straight Cath Updated: 05/25/22 1406     RBC, UA 4-10 /hpf      WBC, UA 0-1 /hpf      Epithelial Cells Occasional /hpf      Bacteria, UA None Seen /hpf     UA w Reflex to Microscopic w Reflex to Culture [[id number]]  (Abnormal) Collected: 05/25/22 1337    Lab Status: Final result Specimen: Urine, Straight Cath Updated: 05/25/22 1355     Color, UA Yellow     Clarity, UA Clear     Specific [place], UA 1 020     pH, UA 6 0     Leukocytes, UA Negative     Nitrite, UA Negative     Protein, UA Negative mg/dl      Glucose, UA Negative mg/dl      Ketones, UA 15 (1+) mg/dl      Urobilinogen, UA 0 2 E U /dl      Bilirubin, UA Negative     Blood, UA Small    CBC and differential [[id number]]  (Abnormal) Collected: 05/25/22 1341    Lab Status: Final result Specimen: Blood from Arm, Right Updated: 05/25/22 1353     WBC 8 22 Thousand/uL      RBC 3 45 Million/uL      Hemoglobin 9 4 g/dL      Hematocrit 30 8 %      MCV 89 fL      MCH 27 2 pg      MCHC 30 5 g/dL      RDW 18 6 %      MPV 10 7 fL      Platelets 226 Thousands/uL      nRBC 0 /100 WBCs      Neutrophils Relative 73 %      Immat GRANS % 2 %      Lymphocytes Relative 11 %      Monocytes Relative 13 %      Eosinophils Relative 1 %      Basophils Relative 0 %      Neutrophils Absolute 6 00 Thousands/µL      Immature Grans Absolute 0 20 Thousand/uL      Lymphocytes Absolute 0 92 Thousands/µL      Monocytes Absolute 1 03 Thousand/µL      Eosinophils Absolute 0 06 Thousand/µL      Basophils Absolute 0 01 Thousands/µL     COVID/FLU/RSV - 2 hour TAT [[id number]]  (Abnormal) Collected: 05/25/22 1248    Lab Status: Final result Specimen: Nares from Nasopharyngeal Swab Updated: 05/25/22 1341     SARS-CoV-2 Positive     INFLUENZA A PCR Negative     INFLUENZA B PCR Negative     RSV PCR Negative    Narrative:      FOR PEDIATRIC PATIENTS - copy/paste COVID Guidelines URL to browser: [email or web address]  [employer]    SARS-CoV-2 assay is a Nucleic Acid Amplification assay intended for the  qualitative detection of nucleic acid from SARS-CoV-2 in nasopharyngeal  swabs  Results are for the presumptive identification of SARS-CoV-2 RNA  Positive results are indicative of infection with SARS-CoV-2, the virus  causing COVID-19, but do not rule out bacterial infection or co-infection  with other viruses  Laboratories within the [place] and its  territories are required to report all positive results to the appropriate  public health authorities  Negative results do not preclude SARS-CoV-2  infection and should not be used as the sole basis for treatment or other  patient management decisions  Negative results must be combined with  clinical observations, patient history, and epidemiological information  This test has not been FDA cleared or approved  This test has been authorized by FDA under an Emergency Use Authorization  (EUA)  This test is only authorized for the duration of time the  declaration that circumstances exist justifying the authorization of the  emergency use of an in vitro diagnostic tests for detection of SARS-CoV-2  virus and/or diagnosis of COVID-19 infection under section 564(b)(1) of  the Act, 21 U  S C  340VDG-8(D)(8), unless the authorization is terminated  or revoked sooner  The test has been validated but independent review by FDA  and CLIA is pending  Test performed using [employer] Gene[employer]pert: This RT-PCR assay targets N2,  a region unique to SARS-CoV-2  A conserved region in the E-gene was chosen  for pan-Sarbecovirus detection which includes SARS-CoV-2      Lipase [[id number]]  (Abnormal) Collected: 05/25/22 0757    Lab Status: Final result Specimen: Blood from Arm, Right Updated: 05/25/22 0853     Lipase 5,854 u/L     Comprehensive metabolic panel [[id number]]  (Abnormal) Collected: 05/25/22 0757    Lab Status: Final result Specimen: Blood from Arm, Right Updated: 05/25/22 0843     Sodium 142 mmol/L      Potassium 3 7 mmol/L      Chloride 104 mmol/L      CO2 25 mmol/L      ANION GAP 13 mmol/L      BUN 23 mg/dL      Creatinine 1 20 mg/dL      Glucose 127 mg/dL      Calcium 8 8 mg/dL      Corrected Calcium 9 5 mg/dL      AST 15 U/L      ALT 15 U/L      Alkaline Phosphatase 72 U/L      Total Protein 7 5 g/dL      Albumin 3 1 g/dL      Total Bilirubin 0 64 mg/dL      eGFR 36 ml/min/1 73sq m     Narrative:      [place] guidelines for Chronic Kidney Disease (CKD):     Stage 1 with normal or high GFR (GFR > 90 mL/min/1 73 square meters)    Stage 2 Mild CKD (GFR = 60-89 mL/min/1 73 square meters)    Stage 3A Moderate CKD (GFR = 45-59 mL/min/1 73 square meters)    Stage 3B Moderate CKD (GFR = 30-44 mL/min/1 73 square meters)    Stage 4 Severe CKD (GFR = 15-29 mL/min/1 73 square meters)    Stage 5 End Stage CKD (GFR <15 mL/min/1 73 square meters)  Note: GFR calculation is accurate only with a steady state creatinine    Lactic acid [[id number]]  (Normal) Collected: 05/25/22 0757    Lab Status: Final result Specimen: Blood from Arm, Right Updated: 05/25/22 0843     LACTIC ACID 1 4 mmol/L     Narrative:      Result may be elevated if tourniquet was used during collection      Protime-INR [[id number]]  (Abnormal) Collected: 05/25/22 0757    Lab Status: Final result Specimen: Blood from Arm, Right Updated: 05/25/22 0837     Protime 42 3 seconds      INR 4 64    APTT [[id number]]  (Abnormal) Collected: 05/25/22 0757    Lab Status: Final result Specimen: Blood from Arm, Right Updated: 05/25/22 0837     PTT 72 seconds     CBC and differential [[id number]]  (Abnormal) Collected: 05/25/22 0757    Lab Status: Final result Specimen: Blood from Arm, Right Updated: 05/25/22 0804     WBC 7 38 Thousand/uL      RBC 3 61 Million/uL      Hemoglobin 9 7 g/dL      Hematocrit 32 1 %      MCV 89 fL      MCH 26 9 pg      MCHC 30 2 g/dL      RDW 18 5 %      MPV 10 5 fL      Platelets 907 Thousands/uL      nRBC 0 /100 WBCs      Neutrophils Relative 70 %      Immat GRANS % 2 % Lymphocytes Relative 14 %      Monocytes Relative 13 %      Eosinophils Relative 1 %      Basophils Relative 0 %      Neutrophils Absolute 5 12 Thousands/µL      Immature Grans Absolute 0 18 Thousand/uL      Lymphocytes Absolute 1 01 Thousands/µL      Monocytes Absolute 0 96 Thousand/µL      Eosinophils Absolute 0 10 Thousand/µL      Basophils Absolute 0 01 Thousands/µL                  MRI abdomen wo contrast and mrcp   Final Result by Edgard Jones MD (05/25 1242)      Markedly dilated common bile duct and the pancreatic duct noted with suggestion of calculi in the ampullary portion of the common bile duct, better seen on the CT from May 25, 2022, image 79 series 601      Markedly dilated pancreatic duct with some clubbing and dilation of the side branches in the distal portion of the pancreatic duct   Multiple pancreatic ductal calcifications which were noted on the CT are not not so well seen MRI      A serpiginous tubular cystic area seen within the uncinate process of the pancreas measuring 2 6 cm may be a cystic pancreatic lesion or due to marked dilation of the ventral pancreatic duct      Mild hepatic steatosis      Mild dilation of the both renal pelvises and ureters with distended urinary bladder, correlate with the bladder outlet obstruction      No peripancreatic fluid collection   T1 hyperintense focal lesion described on the recent CT remains indeterminate the contrast however a hemorrhagic cyst is favored          I personally discussed this study with ORLIN AGUIRRE on 5/25/2022 at 12:41 PM                    Workstation performed: [id number]         CT abdomen pelvis wo contrast   Final Result by Darlene Shine MD (05/25 5337)    IMPRESSION:        1  Intrahepatic and extrahepatic biliary ductal dilatation with possible choledocholithiasis, dilatation of the pancreatic duct and calcifications at the level of the pancreas as well as abnormal attenuation    Findings could be a reflection of chronic    pancreatitis but evaluation with MRI abdomen with MRCP is recommended  2   Hyperdense right renal lesion probably hemorrhagic or proteinaceous lesion with other smaller low-attenuation renal lesions probably cysts  Right renal lesion can also be evaluated at the time of MRI abdomen  3   Fecal impaction  4   Right adnexal cyst   Consider pelvic ultrasound  5   Right lower lobe density, most likely an area of scar  The study was marked in [facility] for immediate notification  Workstation performed: [id number]                    Procedures  ECG 12 Lead Documentation Only    Date/Time: 5/25/2022 7:59 AM  Performed by: Layo White MD  Authorized by: Layo White MD     ECG reviewed by me, the ED Provider: yes    Patient location:  ED  Previous ECG:     Previous ECG:  Compared to current    Similarity:  No change  Rate:     ECG rate:  80  Rhythm:     Rhythm: sinus rhythm    Ectopy:     Ectopy: none    QRS:     QRS axis:  Normal             ED Course  ED Course as of 05/25/22 1748   Wed May 25, 2022   0748 Disimpacted by me  Small amount of stool obtain  0807 Hemoglobin(!): 9 7  Hemoglobin was 14  2 years ago   1228 Discussed with patient and daughter  May need ERCP  Given the option of [facility] or [employer]  Family requests to go to [facility]  1249 CT abdomen pelvis wo contrast   1419 Discussed with Dr Sarahi Fermin at [facility]  Bed at [facility] will probably be available tomorrow morning  Asked to give vitamin K for elevated INR    Made aware of COVID positive                                             MDM    Disposition  Final diagnoses:   Gastrointestinal hemorrhage, unspecified gastrointestinal hemorrhage type   Acute pancreatitis without infection or necrosis, unspecified pancreatitis type   COVID-19 virus infection     Time reflects when diagnosis was documented in both MDM as applicable and the Disposition within this note     Time User Action Codes Description Comment    5/25/2022  8:07 AM Susan Bob Add [K92 2] Gastrointestinal hemorrhage, unspecified gastrointestinal hemorrhage type     5/25/2022  8:54 AM Gume Uriostegui Add [K85 90] Acute pancreatitis without infection or necrosis, unspecified pancreatitis type     5/25/2022  2:06 PM Gume Uriostegui Add [U07 1] COVID-19 virus infection       ED Disposition     ED Disposition   Transfer to Another Facility-In Network    Condition   --    Date/Time   Wed May 25, 2022 12:33 PM    Comment   Shree [facility] should be transferred out to [facility] TOM Documentation    [address] Most Recent Value   Patient Condition The patient has been stabilized such that within reasonable medical probability, no material deterioration of the patient condition or the condition of the unborn child(melissa) is likely to result from the transfer   Reason for Transfer Level of Care needed not available at this facility, Patient/Family request, No bed available at level of patient's needs   Benefits of Transfer Specialized equipment and/or services available at the receiving facility (Include comment)________________________   Risks of Transfer Potential for delay in receiving treatment, Potential deterioration of medical condition, Loss of IV, Increased discomfort during transfer   Accepting Physician Kelsy Villagomez [address]  [facility]    (Name & Tel number) Ciro Cantu at [facility]   Sending MD Mon 83   Provider Certification General risk, such as traffic hazards, adverse weather conditions, rough terrain or turbulence, possible failure of equipment (including vehicle or aircraft), or consequences of actions of persons outside the control of the transport personnel, Unanticipated needs of medical equipment and personnel during transport, Risk of worsening condition, The possibility of a transport vehicle being unavailable      RN Documentation    Flowsheet Row Most Recent Value Accepting Facility Name, [facility]    (Name & Tel number) Kade Larsen at [address]    None         Patient's Medications   Discharge Prescriptions    No medications on file       No discharge procedures on file      PDMP Review     None          ED Provider  Electronically Signed by           Edward Lopez MD  05/25/22 [address] [facility] Jannette Redd MD  05/25/22 [address] [facility] Jannette Redd MD  05/25/22 [address] [place] [address] Jannette Redd MD  05/25/22 0801

## 2022-05-25 NOTE — ASSESSMENT & PLAN NOTE
MRI shows "Mild dilation of the both renal pelvises and ureters with distended urinary bladder, correlate with the bladder outlet obstruction"  Renal function stable   Urinary retention protocol  IVF hydration

## 2022-05-25 NOTE — ASSESSMENT & PLAN NOTE
CT shows right renal lesion probably hemorrhagic or proteinaceous lesion, possible cyst  Monitor renal function  Consider ultrasound kidneys and bladder

## 2022-05-25 NOTE — ASSESSMENT & PLAN NOTE
INR 4 64  Per family - report hx of prior CVAs, goal INR due to her fall risk is 1 7-2 2     Received 10 mg of IV vitamin K in the ER  Hold Coumadin and recheck INR tomorrow

## 2022-05-25 NOTE — EMTALA/ACUTE CARE TRANSFER
[address] [place] [address]  [facility] [address]  Dept: [phone]      EMTALA TRANSFER CONSENT    NAME Radha JUAREZ 1920                              MRN [id number]    I have been informed of my rights regarding examination, treatment, and transfer   by Dr Meche Israel MD    Benefits: Specialized equipment and/or services available at the receiving facility (Include comment)________________________    Risks: Potential for delay in receiving treatment, Potential deterioration of medical condition, Loss of IV, Increased discomfort during transfer          I authorize the performance of emergency medical procedures and treatments upon me in both transit and upon arrival at the receiving facility  Additionally, I authorize the release of any and all medical records to the receiving facility and request they be transported with me, if possible  I understand that the safest mode of transportation during a medical emergency is an ambulance and that the Hospital advocates the use of this mode of transport  Risks of traveling to the receiving facility by car, including absence of medical control, life sustaining equipment, such as oxygen, and medical personnel has been explained to me and I fully understand them  (PRASAD CORRECT BOX BELOW)  [  ]  I consent to the stated transfer and to be transported by ambulance/helicopter  [  ]  I consent to the stated transfer, but refuse transportation by ambulance and accept full responsibility for my transportation by car    I understand the risks of non-ambulance transfers and I exonerate the Hospital and its staff from any deterioration in my condition that results from this refusal     X___________________________________________    DATE  22  TIME________  Signature of patient or legally responsible individual signing on patient behalf           RELATIONSHIP TO PATIENT_________________________          Provider Certification    NAME Tanya Cintron                                         1920                              MRN [id number]    A medical screening exam was performed on the above named patient  Based on the examination:    Condition Necessitating Transfer The primary encounter diagnosis was Gastrointestinal hemorrhage, unspecified gastrointestinal hemorrhage type  A diagnosis of Acute pancreatitis without infection or necrosis, unspecified pancreatitis type was also pertinent to this visit  Patient Condition: The patient has been stabilized such that within reasonable medical probability, no material deterioration of the patient condition or the condition of the unborn child(melissa) is likely to result from the transfer    Reason for Transfer: Level of Care needed not available at this facility, Patient/Family request, No bed available at level of patient's needs    Transfer Requirements: Facility [facility]   · Space available and qualified personnel available for treatment as acknowledged by [place] [facility]  · Agreed to accept transfer and to provide appropriate medical treatment as acknowledged by       [employer]  · Appropriate medical records of the examination and treatment of the patient are provided at the time of transfer   [address] [place] [address], [address] _______  · Transfer will be performed by qualified personnel from    and appropriate transfer equipment as required, including the use of necessary and appropriate life support measures      Provider Certification: I have examined the patient and explained the following risks and benefits of being transferred/refusing transfer to the patient/family:  General risk, such as traffic hazards, adverse weather conditions, rough terrain or turbulence, possible failure of equipment (including vehicle or aircraft), or consequences of actions of persons outside the control of the transport personnel, Unanticipated needs of medical equipment and personnel during transport, Risk of worsening condition, The possibility of a transport vehicle being unavailable      Based on these reasonable risks and benefits to the patient and/or the unborn child(melissa), and based upon the information available at the time of the patients examination, I certify that the medical benefits reasonably to be expected from the provision of appropriate medical treatments at another medical facility outweigh the increasing risks, if any, to the individuals medical condition, and in the case of labor to the unborn child, from effecting the transfer      X____________________________________________ DATE 05/25/22        TIME_______      ORIGINAL - SEND TO MEDICAL RECORDS   COPY - SEND WITH PATIENT DURING TRANSFER

## 2022-05-25 NOTE — CONSULTS
Kelsy Jamil 666 12/26/1920, 80 y o  female MRN: [id number]  Unit/Bed#: ED 08 Encounter: [id number]  Primary Care Provider: Juan Daniel Mckee DO   Date and time admitted to hospital: 5/25/2022  7:40 AM    Consult to internal medicine  Consult performed by: Gunner Hernandez PA-C  Consult ordered by: Douglas Lamas MD        * Acute pancreatitis  Assessment & Plan  Pt presented with diarrhea  Lipase greater than 5000  CT A/P shows intrahepatic and extrahepatic biliary ductal dilatation possible choledocholithiasis, dilatation of the pancreatic duct and calcifications   MRCP shows mild hepatic steatosis, markedly dilated common bile duct and pancreatic duct with calculi in the ampullary portion of the CBD, markedly dilated pancreatic up dilation of the side branches  Fortunately liver enzymes and bilirubin are normal  Plan for patient to be transferred to [facility]  Per now would keep patient NPO and start IV fluids  Pain control  Eventual GI consult    COVID-19  Assessment & Plan  Incidentally COVID-19 POS in ER   Patient on room air   Mild tx pathway   Supportive measures   Check d-dimer     Diarrhea  Assessment & Plan  Presented with diarrhea   CT shows fecal impaction - may be overflow diarhea   Patient was disimpacted in the ER with evidence of scant BRBPR   Encourage oral hydration and continue bowel reg     Supratherapeutic INR  Assessment & Plan  INR 4 64  Per family - report hx of prior CVAs, goal INR due to her fall risk is 1 7-2 2     Received 10 mg of IV vitamin K in the ER  Hold Coumadin and recheck INR tomorrow    Renal lesion  Assessment & Plan  CT shows right renal lesion probably hemorrhagic or proteinaceous lesion, possible cyst  Monitor renal function  Consider ultrasound kidneys and bladder    Hyperlipidemia  Assessment & Plan  Continue statin     Bladder distention  Assessment & Plan  MRI shows "Mild dilation of the both renal pelvises and ureters with distended urinary bladder, correlate with the bladder outlet obstruction"  Renal function stable   Urinary retention protocol  IVF hydration     Adnexal cyst  Assessment & Plan  Incidental finding on CT A/P   R adnexal cyst   Consider OP pelvic US       VTE Prophylaxis: VTE Score: 6 High Risk (Score >/= 5) - Pharmacological DVT Prophylaxis Contraindicated  Sequential Compression Devices Ordered  Patient is normally on Coumadin INR is supratherapeutic and thus we will hold Coumadin were DVT prophylaxis  Recommendations for Discharge:  · Transfer to [facility] for GI work up     Counseling / Coordination of Care Time: 45 minutes Greater than 50% of total time spent on patient counseling and coordination of care  Collaboration of Care: Were Recommendations Directly Discussed with Primary Treatment Team? Yes    History of Present Illness:  Brittney Abel is a 80 y o  female who originally presented to [address] [place] [address] emergency department with diarrhea  Patient was found to have CBD stone causing acute pancreatitis  Patient while awaiting transfer to higher level of care required medicine consultation for ongoing care in the emergency department  Patient has past medical history significant for hyperlipidemia and HTN  She also has hx of strokes and is on coumadin  Per family she lives with her one son who is currently admitted at a different [facility]  Apparently a few days ago she wasn't feeling herself, was fatigued  Reports occasional right upper quadrant abdominal pain but reports history of cholecystectomy  She apparently had diarrhea overnight that was sometimes dark in color  During fecal disimpaction in the ER he did have some bright red blood per rectum briefly  Currently patient denies fevers or chills  She denies cough congestion or shortness of breath  She does have dry mouth and right eye conjunctivitis but otherwise no upper respiratory or lower respiratory symptoms      Review of Systems:  Review of Systems   Constitutional: Negative for chills and fever  HENT: Negative for congestion  Respiratory: Negative for cough and shortness of breath  Cardiovascular: Negative for chest pain  Gastrointestinal: Positive for abdominal pain, constipation, diarrhea and nausea  Genitourinary: Positive for difficulty urinating  Musculoskeletal: Positive for gait problem  Negative for back pain  Neurological: Negative for dizziness and light-headedness  Psychiatric/Behavioral: Negative for confusion  Past Medical and Surgical History:   Past Medical History:   Diagnosis Date    Hypertension     Stroke [facility])        History reviewed  No pertinent surgical history  Meds/Allergies:  all medications and allergies reviewed    Allergies: Allergies   Allergen Reactions    Ace Inhibitors     Clonidine     Fexofenadine     Gabapentin     Prednisolone     Simvastatin        Social History:  Marital Status: /Civil Union  Substance Use History:   Social History     Substance and Sexual Activity   Alcohol Use Never     Social History     Tobacco Use   Smoking Status Never Smoker   Smokeless Tobacco Never Used     Social History     Substance and Sexual Activity   Drug Use Never       Family History:  History reviewed  No pertinent family history  Physical Exam:   Vitals:   Blood Pressure: 120/63 (05/25/22 1430)  Pulse: 88 (05/25/22 1430)  Temperature: (!) 97 2 °F (36 2 °C) (05/25/22 0743)  Temp Source: Temporal (05/25/22 0743)  Respirations: 21 (05/25/22 1430)  Weight - Scale: 53 8 kg (118 lb 9 7 oz) (05/25/22 0743)  SpO2: 97 % (05/25/22 1430)    Physical Exam  Constitutional:       Appearance: Normal appearance  She is not ill-appearing  HENT:      Head: Normocephalic and atraumatic  Ears:      Comments: Hard of hearing      Mouth/Throat:      Mouth: Mucous membranes are moist    Eyes:      Extraocular Movements: Extraocular movements intact     Cardiovascular: Rate and Rhythm: Normal rate and regular rhythm  Pulmonary:      Effort: Pulmonary effort is normal       Breath sounds: Normal breath sounds  Abdominal:      General: Abdomen is flat  Palpations: Abdomen is soft  Tenderness: There is abdominal tenderness  Musculoskeletal:         General: No swelling  Normal range of motion  Cervical back: Normal range of motion and neck supple  Skin:     General: Skin is warm and dry  Neurological:      General: No focal deficit present  Mental Status: She is alert     Psychiatric:         Mood and Affect: Mood normal        Additional Data:   Lab Results:    Results from last 7 days   Lab Units 05/25/22  1341   WBC Thousand/uL 8 22   HEMOGLOBIN g/dL 9 4*   HEMATOCRIT % 30 8*   PLATELETS Thousands/uL 287   NEUTROS PCT % 73   LYMPHS PCT % 11*   MONOS PCT % 13*   EOS PCT % 1     Results from last 7 days   Lab Units 05/25/22  0757   SODIUM mmol/L 142   POTASSIUM mmol/L 3 7   CHLORIDE mmol/L 104   CO2 mmol/L 25   BUN mg/dL 23   CREATININE mg/dL 1 20   ANION GAP mmol/L 13   CALCIUM mg/dL 8 8   ALBUMIN g/dL 3 1*   TOTAL BILIRUBIN mg/dL 0 64   ALK PHOS U/L 72   ALT U/L 15   AST U/L 15   GLUCOSE RANDOM mg/dL 127     Results from last 7 days   Lab Units 05/25/22  0757   INR  4 64*         Lab Results   Component Value Date/Time    HGBA1C 5 6 03/12/2020 05:35 AM         Results from last 7 days   Lab Units 05/25/22  0757   LACTIC ACID mmol/L 1 4       Imaging: Reviewed radiology reports from this admission including: abdominal/pelvic CT and MRI abdomen/MRCP  MRI abdomen wo contrast and mrcp   Final Result by Melissa Barragan MD (05/25 1242)      Markedly dilated common bile duct and the pancreatic duct noted with suggestion of calculi in the ampullary portion of the common bile duct, better seen on the CT from May 25, 2022, image 79 series 601      Markedly dilated pancreatic duct with some clubbing and dilation of the side branches in the distal portion of the pancreatic duct   Multiple pancreatic ductal calcifications which were noted on the CT are not not so well seen MRI      A serpiginous tubular cystic area seen within the uncinate process of the pancreas measuring 2 6 cm may be a cystic pancreatic lesion or due to marked dilation of the ventral pancreatic duct      Mild hepatic steatosis      Mild dilation of the both renal pelvises and ureters with distended urinary bladder, correlate with the bladder outlet obstruction      No peripancreatic fluid collection   T1 hyperintense focal lesion described on the recent CT remains indeterminate the contrast however a hemorrhagic cyst is favored          I personally discussed this study with ORLIN AGUIRRE on 5/25/2022 at 12:41 PM                    Workstation performed: [id number]         CT abdomen pelvis wo contrast   Final Result by Daralene Fleischer, MD (05/25 1036)    IMPRESSION:        1  Intrahepatic and extrahepatic biliary ductal dilatation with possible choledocholithiasis, dilatation of the pancreatic duct and calcifications at the level of the pancreas as well as abnormal attenuation  Findings could be a reflection of chronic    pancreatitis but evaluation with MRI abdomen with MRCP is recommended  2   Hyperdense right renal lesion probably hemorrhagic or proteinaceous lesion with other smaller low-attenuation renal lesions probably cysts  Right renal lesion can also be evaluated at the time of MRI abdomen  3   Fecal impaction  4   Right adnexal cyst   Consider pelvic ultrasound  5   Right lower lobe density, most likely an area of scar  The study was marked in [facility] for immediate notification  Workstation performed: [id number]             EKG, Pathology, and Other Studies Reviewed on Admission:   · EKG: NSR  HR 85     ** Please Note: This note may have been constructed using a voice recognition system   **